# Patient Record
Sex: FEMALE | Race: WHITE | Employment: OTHER | URBAN - METROPOLITAN AREA
[De-identification: names, ages, dates, MRNs, and addresses within clinical notes are randomized per-mention and may not be internally consistent; named-entity substitution may affect disease eponyms.]

---

## 2019-09-02 ENCOUNTER — TELEPHONE (OUTPATIENT)
Dept: INTERNAL MEDICINE CLINIC | Age: 79
End: 2019-09-02

## 2019-09-02 NOTE — TELEPHONE ENCOUNTER
Pt calls stating right leg sore and has some swelling. States has varicose veins. Symptoms ongoing for a week. Not seen in office since 2015. Referred to urgent care if feels she needs to be seen today otherwise call office tomorrow for an appointment.

## 2019-09-06 ENCOUNTER — OFFICE VISIT (OUTPATIENT)
Dept: FAMILY MEDICINE CLINIC | Age: 79
End: 2019-09-06

## 2019-09-06 VITALS
OXYGEN SATURATION: 98 % | TEMPERATURE: 98.5 F | WEIGHT: 213 LBS | HEIGHT: 63 IN | SYSTOLIC BLOOD PRESSURE: 131 MMHG | RESPIRATION RATE: 18 BRPM | HEART RATE: 90 BPM | BODY MASS INDEX: 37.74 KG/M2 | DIASTOLIC BLOOD PRESSURE: 61 MMHG

## 2019-09-06 DIAGNOSIS — M19.90 OSTEOARTHRITIS: ICD-10-CM

## 2019-09-06 DIAGNOSIS — R53.83 TIRED: ICD-10-CM

## 2019-09-06 DIAGNOSIS — Z00.00 ENCOUNTER FOR INITIAL PREVENTIVE PHYSICAL EXAMINATION COVERED BY MEDICARE: Primary | ICD-10-CM

## 2019-09-06 DIAGNOSIS — D50.8 OTHER IRON DEFICIENCY ANEMIA: ICD-10-CM

## 2019-09-06 DIAGNOSIS — E66.01 SEVERE OBESITY (HCC): ICD-10-CM

## 2019-09-06 DIAGNOSIS — K21.9 GASTROESOPHAGEAL REFLUX DISEASE WITHOUT ESOPHAGITIS: ICD-10-CM

## 2019-09-06 RX ORDER — OMEPRAZOLE 20 MG/1
20 CAPSULE, DELAYED RELEASE ORAL DAILY
Qty: 90 CAP | Refills: 1 | Status: SHIPPED | OUTPATIENT
Start: 2019-09-06 | End: 2020-08-04

## 2019-09-06 RX ORDER — OMEPRAZOLE 20 MG/1
20 CAPSULE, DELAYED RELEASE ORAL DAILY
Qty: 90 CAP | Refills: 1 | Status: SHIPPED | OUTPATIENT
Start: 2019-09-06 | End: 2019-09-06 | Stop reason: SDUPTHER

## 2019-09-06 NOTE — PROGRESS NOTES
This is the Subsequent Medicare Annual Wellness Exam, performed 12 months or more after the Initial AWV or the last Subsequent AWV    I have reviewed the patient's medical history in detail and updated the computerized patient record. History     Past Medical History:   Diagnosis Date    Anemia due to chronic blood loss 3/19/2011    DDD (degenerative disc disease), lumbar     ruptured disc    GI bleed 3/19/2011      Past Surgical History:   Procedure Laterality Date    DEXA BONE DENSITY STUDY AXIAL  4/12/11    nml    HX CATARACT REMOVAL  2008    left, with lens    HX CHOLECYSTECTOMY      HX COLONOSCOPY  3/11    HX HEENT      deviated septum repair    HX ORTHOPAEDIC  1984    right elbow fracture repair    HX ORTHOPAEDIC  2011    right elbow, carpal tunnel    HX TONSILLECTOMY      SOY MAMMOGRAM DIGITAL BILATERAL  4/5/12    nml    NEUROLOGICAL PROCEDURE UNLISTED  2009    ulnar release--right elbow    TOTAL HIP ARTHROPLASTY  1998    left     Current Outpatient Medications   Medication Sig Dispense Refill    omeprazole (PRILOSEC) 20 mg capsule Take 1 Cap by mouth daily. 90 Cap 1    omeprazole (PRILOSEC) 20 mg capsule Take 1 Cap by mouth daily. 90 Cap 2    white petrolatum (DRY EYES) ointment Administer  to both eyes every twelve (12) hours.  dextran 70-hypromellose (ARTIFICIAL TEARS) ophthalmic solution Administer  to both eyes as needed.  MAGNESIUM HYDROXIDE (MILK OF MAGNESIA PO) Take  by mouth.  BISMUTH SUBSALICYLATE (PEPTO-BISMOL PO) Take  by mouth.  propylene glycol-glycerin (ARTIFICIAL TEARS,GLYCERIN-PEG,) 1-0.3 % drop Apply  to eye.  SIMETHICONE (GAS-X PO) Take  by mouth.  white petrolatum (LACRILUBE) ointment Administer  to both eyes every twelve (12) hours.  naproxen sodium (ALEVE) 220 mg cap Take  by mouth.        Allergies   Allergen Reactions    Contuss Unknown (comments)     Unknown      Sulfa (Sulfonamide Antibiotics) Hives and Swelling     Family History   Problem Relation Age of Onset    Alcohol abuse Father     COPD Father    [employer] Cancer Brother         bladder    Alcohol abuse Sister     Dementia Sister     Heart Disease Brother         PAD    Heart Disease Brother      Social History     Tobacco Use    Smoking status: Never Smoker    Smokeless tobacco: Never Used   Substance Use Topics    Alcohol use: No     Patient Active Problem List   Diagnosis Code    GERD (gastroesophageal reflux disease) K21.9    Ghotra's esophagus K22.70    Dry eyes H04.123    TMJ arthralgia M26.629    Rosacea L71.9    Allergic rhinitis J30.9    Headache(784.0) R51    Insomnia G47.00    Cataract H26.9    Osteoarthritis M19.90    Diverticula of colon K57.30    IBS (irritable bowel syndrome) K58.9    Severe obesity ([address] E66.01       Depression Risk Factor Screening:     3 most recent PHQ Screens 9/6/2019   Little interest or pleasure in doing things Several days   Feeling down, depressed, irritable, or hopeless Several days   Total Score PHQ 2 2     Alcohol Risk Factor Screening: You do not drink alcohol or very rarely. Functional Ability and Level of Safety:   Hearing Loss  Hearing is good. Activities of Daily Living  The home contains: cane  Patient does total self care    Fall Risk  Fall Risk Assessment, last 12 mths 3/26/2015   Able to walk? Yes   Fall in past 12 months?  Yes       Abuse Screen  Patient is not abused    Cognitive Screening   Evaluation of Cognitive Function:  Has your family/caregiver stated any concerns about your memory: no  Normal    Patient Care Team   Patient Care Team:  Antione Nevarez NP as PCP - General (Family Practice)  Ally Grajeda, RN as Nurse Navigator (Internal Medicine)  Rosa M Li MD as Physician (Ophthalmology)  Christy Batista MD as Surgeon (Orthopedic Surgery)  Kaur Copeland MD as Physician (Gastroenterology)    Assessment/Plan   Education and counseling provided:  Are appropriate based on today's review and evaluation    Diagnoses and all orders for this visit:    1. Encounter for initial preventive physical examination covered by Medicare  -     LIPID PANEL    2. Severe obesity ([address]    3. Tired  -     METABOLIC PANEL, COMPREHENSIVE  -     CBC W/O DIFF  -     TSH 3RD GENERATION  -     T4, FREE  -     VITAMIN D, 25 HYDROXY  -     VITAMIN B12    4. Gastroesophageal reflux disease without esophagitis  -     omeprazole (PRILOSEC) 20 mg capsule; Take 1 Cap by mouth daily. 5. Other iron deficiency anemia  -     CBC W/O DIFF    6.  Osteoarthritis        Health Maintenance Due   Topic Date Due    DTaP/Tdap/Td series (1 - Tdap) 02/23/1963    Shingrix Vaccine Age 50> (1 of 2) 02/23/1992    PAP AKA CERVICAL CYTOLOGY  03/26/2016    Pneumococcal 65+ years (2 of 2 - PPSV23) 03/26/2016    GLAUCOMA SCREENING Q2Y  10/08/2017    Influenza Age 9 to Adult  08/01/2019

## 2019-09-06 NOTE — PROGRESS NOTES
HISTORY OF PRESENT ILLNESS  Anselmo Cedeno is a 70 y.o. female. HPI: New patient comes in to get established and has history of obesity, anemia, GERD and osteoarthritis . She comes in today complaining of fatigue and bilateral hip and lower leg pains. She has order for iron pill, but she is not taking them states,--they bother my stomach-- for hip pain she take one OTC Advil occasionally. Patient is due for medicare physical.    Past Medical History:   Diagnosis Date    Anemia due to chronic blood loss 3/19/2011    DDD (degenerative disc disease), lumbar     ruptured disc    GI bleed 3/19/2011    Obesity (BMI 30-39. 9)      Past Surgical History:   Procedure Laterality Date    DEXA BONE DENSITY STUDY AXIAL  4/12/11    nml    HX CATARACT REMOVAL  2008    left, with lens    HX CHOLECYSTECTOMY      HX COLONOSCOPY  3/11    HX HEENT      deviated septum repair    HX ORTHOPAEDIC  1984    right elbow fracture repair    HX ORTHOPAEDIC  2011    right elbow, carpal tunnel    HX TONSILLECTOMY      SOY MAMMOGRAM DIGITAL BILATERAL  4/5/12    nml    NEUROLOGICAL PROCEDURE UNLISTED  2009    ulnar release--right elbow    TOTAL HIP ARTHROPLASTY  1998    left     Allergies   Allergen Reactions    Contuss Unknown (comments)     Unknown      Sulfa (Sulfonamide Antibiotics) Hives and Swelling       Current Outpatient Medications:     omeprazole (PRILOSEC) 20 mg capsule, Take 1 Cap by mouth daily. , Disp: 90 Cap, Rfl: 1    white petrolatum (DRY EYES) ointment, Administer  to both eyes every twelve (12) hours. , Disp: , Rfl:     dextran 70-hypromellose (ARTIFICIAL TEARS) ophthalmic solution, Administer  to both eyes as needed. , Disp: , Rfl:     MAGNESIUM HYDROXIDE (MILK OF MAGNESIA PO), Take  by mouth., Disp: , Rfl:     BISMUTH SUBSALICYLATE (PEPTO-BISMOL PO), Take  by mouth., Disp: , Rfl:     propylene glycol-glycerin (ARTIFICIAL TEARS,GLYCERIN-PEG,) 1-0.3 % drop, Apply  to eye., Disp: , Rfl:     SIMETHICONE (GAS-X PO), Take  by mouth., Disp: , Rfl:     white petrolatum (LACRILUBE) ointment, Administer  to both eyes every twelve (12) hours. , Disp: , Rfl:     naproxen sodium (ALEVE) 220 mg cap, Take  by mouth., Disp: , Rfl:   Review of Systems   Constitutional: Positive for malaise/fatigue. HENT: Negative. Respiratory: Negative. Cardiovascular: Negative. Gastrointestinal: Negative. Genitourinary: Negative. Musculoskeletal: Positive for joint pain. Psychiatric/Behavioral: Negative. Blood pressure 131/61, pulse 90, temperature 98.5 °F (36.9 °C), temperature source Oral, resp. rate 18, height 5' 2.75\" (1.594 m), weight 213 lb (96.6 kg), SpO2 98 %. Body mass index is 38.03 kg/m². Physical Exam   Constitutional: No distress. Eyes: Pupils are equal, round, and reactive to light. Conjunctivae and EOM are normal.   Neck: Neck supple. Cardiovascular: Normal rate and regular rhythm. Pulmonary/Chest: Effort normal and breath sounds normal.   Abdominal: Soft. Bowel sounds are normal.   Musculoskeletal: She exhibits tenderness. Bilateral hip and lower legs tenderness  Walking with cane   Skin: Skin is warm and dry. Psychiatric: She has a normal mood and affect. Her behavior is normal.   Nursing note and vitals reviewed. ASSESSMENT and PLAN  Diagnoses and all orders for this visit:    1. Encounter for initial preventive physical examination covered by Medicare  -     LIPID PANEL    2. Severe obesity ([address]    3. Tired  -     METABOLIC PANEL, COMPREHENSIVE  -     CBC W/O DIFF  -     TSH 3RD GENERATION  -     T4, FREE  -     VITAMIN D, 25 HYDROXY  -     VITAMIN B12    4. Gastroesophageal reflux disease without esophagitis  -     omeprazole (PRILOSEC) 20 mg capsule; Take 1 Cap by mouth daily. 5. Other iron deficiency anemia  -     CBC W/O DIFF    6.  Osteoarthritis      Take Advil or tylenol  Follow up in three weeks  Pt was given an after visit summary which includes diagnosis, current medicines and vital and voiced understanding of treatment plan

## 2019-09-06 NOTE — PROGRESS NOTES
Chief Complaint   Patient presents with   [address]     Patient is in the office today as a new patient to the [facility].  Leg Pain     discuss right side leg pain. 1. Have you been to the ER, urgent care clinic since your last visit? Hospitalized since your last visit? No    2. Have you seen or consulted any other health care providers outside of the [address] [place] [address] since your last visit? Include any pap smears or colon screening.  No

## 2019-09-06 NOTE — PATIENT INSTRUCTIONS
Medicare Wellness Visit, Female     The best way to live healthy is to have a lifestyle where you eat a well-balanced diet, exercise regularly, limit alcohol use, and quit all forms of tobacco/nicotine, if applicable. Regular preventive services are another way to keep healthy. Preventive services (vaccines, screening tests, monitoring & exams) can help personalize your care plan, which helps you manage your own care. Screening tests can find health problems at the earliest stages, when they are easiest to treat. [facility] follows the current, evidence-based guidelines published by the [facility] ([facility]) when recommending preventive services for our patients. Because we follow these guidelines, sometimes recommendations change over time as research supports it. (For example, mammograms used to be recommended annually. Even though Medicare will still pay for an annual mammogram, the newer guidelines recommend a mammogram every two years for women of average risk.)  Of course, you and your doctor may decide to screen more often for some diseases, based on your risk and your health status. Preventive services for you include:  - Medicare offers their members a free annual wellness visit, which is time for you and your primary care provider to discuss and plan for your preventive service needs. Take advantage of this benefit every year!  -All adults over the age of 72 should receive the recommended pneumonia vaccines. Current USPSTF guidelines recommend a series of two vaccines for the best pneumonia protection.   -All adults should have a flu vaccine yearly and a tetanus vaccine every 10 years. All adults age 61 and older should receive a shingles vaccine once in their lifetime.    -A bone mass density test is recommended when a woman turns 65 to screen for osteoporosis. This test is only recommended one time, as a screening.  Some providers will use this same test as a disease monitoring tool if you already have osteoporosis. -All adults age 38-68 who are overweight should have a diabetes screening test once every three years.   -Other screening tests and preventive services for persons with diabetes include: an eye exam to screen for diabetic retinopathy, a kidney function test, a foot exam, and stricter control over your cholesterol.   -Cardiovascular screening for adults with routine risk involves an electrocardiogram (ECG) at intervals determined by your doctor.   -Colorectal cancer screenings should be done for adults age 54-65 with no increased risk factors for colorectal cancer. There are a number of acceptable methods of screening for this type of cancer. Each test has its own benefits and drawbacks. Discuss with your doctor what is most appropriate for you during your annual wellness visit. The different tests include: colonoscopy (considered the best screening method), a fecal occult blood test, a fecal DNA test, and sigmoidoscopy. -Breast cancer screenings are recommended every other year for women of normal risk, age 54-69.  -Cervical cancer screenings for women over age 72 are only recommended with certain risk factors.   -All adults born between [facility] should be screened once for Hepatitis C.      Here is a list of your current Health Maintenance items (your personalized list of preventive services) with a due date:  Health Maintenance Due   Topic Date Due    DTaP/Tdap/Td  (1 - Tdap) 02/23/1963    Shingles Vaccine (1 of 2) 02/23/1992    Pap Test  03/26/2016    Pneumococcal Vaccine (2 of 2 - PPSV23) 03/26/2016    Glaucoma Screening   10/08/2017    Flu Vaccine  08/01/2019

## 2019-09-07 LAB
25(OH)D3+25(OH)D2 SERPL-MCNC: 14.8 NG/ML (ref 30–100)
ALBUMIN SERPL-MCNC: 4.3 G/DL (ref 3.5–4.8)
ALBUMIN/GLOB SERPL: 1.3 {RATIO} (ref 1.2–2.2)
ALP SERPL-CCNC: 82 IU/L (ref 39–117)
ALT SERPL-CCNC: 11 IU/L (ref 0–32)
AST SERPL-CCNC: 21 IU/L (ref 0–40)
BILIRUB SERPL-MCNC: 1.3 MG/DL (ref 0–1.2)
BUN SERPL-MCNC: 13 MG/DL (ref 8–27)
BUN/CREAT SERPL: 22 (ref 12–28)
CALCIUM SERPL-MCNC: 9 MG/DL (ref 8.7–10.3)
CHLORIDE SERPL-SCNC: 102 MMOL/L (ref 96–106)
CHOLEST SERPL-MCNC: 147 MG/DL (ref 100–199)
CO2 SERPL-SCNC: 20 MMOL/L (ref 20–29)
CREAT SERPL-MCNC: 0.58 MG/DL (ref 0.57–1)
ERYTHROCYTE [DISTWIDTH] IN BLOOD BY AUTOMATED COUNT: 19.2 % (ref 12.3–15.4)
GLOBULIN SER CALC-MCNC: 3.2 G/DL (ref 1.5–4.5)
GLUCOSE SERPL-MCNC: 108 MG/DL (ref 65–99)
HCT VFR BLD AUTO: 26.8 % (ref 34–46.6)
HDLC SERPL-MCNC: 66 MG/DL
HGB BLD-MCNC: 6.5 G/DL (ref 11.1–15.9)
INTERPRETATION, 910389: NORMAL
LDLC SERPL CALC-MCNC: 66 MG/DL (ref 0–99)
MCH RBC QN AUTO: 15.4 PG (ref 26.6–33)
MCHC RBC AUTO-ENTMCNC: 24.3 G/DL (ref 31.5–35.7)
MCV RBC AUTO: 63 FL (ref 79–97)
PLATELET # BLD AUTO: 263 X10E3/UL (ref 150–450)
POTASSIUM SERPL-SCNC: 4.1 MMOL/L (ref 3.5–5.2)
PROT SERPL-MCNC: 7.5 G/DL (ref 6–8.5)
RBC # BLD AUTO: 4.23 X10E6/UL (ref 3.77–5.28)
SODIUM SERPL-SCNC: 139 MMOL/L (ref 134–144)
T4 FREE SERPL-MCNC: 1.07 NG/DL (ref 0.82–1.77)
TRIGL SERPL-MCNC: 77 MG/DL (ref 0–149)
TSH SERPL DL<=0.005 MIU/L-ACNC: 4.28 UIU/ML (ref 0.45–4.5)
VLDLC SERPL CALC-MCNC: 15 MG/DL (ref 5–40)
WBC # BLD AUTO: 8.4 X10E3/UL (ref 3.4–10.8)

## 2019-09-09 ENCOUNTER — TELEPHONE (OUTPATIENT)
Dept: FAMILY MEDICINE CLINIC | Age: 79
End: 2019-09-09

## 2019-09-09 RX ORDER — ERGOCALCIFEROL 1.25 MG/1
50000 CAPSULE ORAL
Qty: 12 CAP | Refills: 4 | Status: SHIPPED | OUTPATIENT
Start: 2019-09-09 | End: 2019-09-10

## 2019-09-09 NOTE — TELEPHONE ENCOUNTER
Karlo Dickey, AKI Oliver LPN   Caller: Unspecified (Today, 11:55 AM)             Spoke to infusing center and informed them I sent the patient to Er not infusion center.  ER will write the order for transfusion

## 2019-09-09 NOTE — TELEPHONE ENCOUNTER
----- Message from Dorita Bledsoe sent at 9/9/2019  2:29 PM EDT -----  Regarding: AKI Nevarez/Telephone  General Message/Vendor Calls    Caller's first and last name:      Reason for call:      Callback required yes/no and why: yes      Best contact number(s):[phone]      Details to clarify the request: Patient is suppose to go to [facility] for a blood transfusion and needs to know where to go.        Dorita Bledsoe

## 2019-09-09 NOTE — TELEPHONE ENCOUNTER
Ciarra Chaudhary pt infusion center at [facility], is calling to advise the doctor that this facility is not a walk in clinic and this patient can not be seen.  Would need to contact [facility] [place] @[phone]        [facility]   [phone]

## 2019-09-10 ENCOUNTER — HOSPITAL ENCOUNTER (INPATIENT)
Age: 79
LOS: 2 days | Discharge: HOME | DRG: 378 | End: 2019-09-12
Attending: EMERGENCY MEDICINE | Admitting: INTERNAL MEDICINE
Payer: MEDICARE

## 2019-09-10 ENCOUNTER — APPOINTMENT (OUTPATIENT)
Dept: VASCULAR SURGERY | Age: 79
DRG: 378 | End: 2019-09-10
Attending: EMERGENCY MEDICINE
Payer: MEDICARE

## 2019-09-10 DIAGNOSIS — R06.02 SOB (SHORTNESS OF BREATH): ICD-10-CM

## 2019-09-10 DIAGNOSIS — R60.0 LEG EDEMA, RIGHT: ICD-10-CM

## 2019-09-10 DIAGNOSIS — D50.0 IRON DEFICIENCY ANEMIA DUE TO CHRONIC BLOOD LOSS: ICD-10-CM

## 2019-09-10 DIAGNOSIS — D50.9 HYPOCHROMIC MICROCYTIC ANEMIA: Primary | ICD-10-CM

## 2019-09-10 PROBLEM — D64.9 SEVERE ANEMIA: Status: ACTIVE | Noted: 2019-09-10

## 2019-09-10 LAB
ALBUMIN SERPL-MCNC: 3.3 G/DL (ref 3.5–5)
ALBUMIN/GLOB SERPL: 0.8 {RATIO} (ref 1.1–2.2)
ALP SERPL-CCNC: 74 U/L (ref 45–117)
ALT SERPL-CCNC: 14 U/L (ref 12–78)
ANION GAP SERPL CALC-SCNC: 8 MMOL/L (ref 5–15)
AST SERPL-CCNC: 13 U/L (ref 15–37)
BASOPHILS # BLD: 0.2 K/UL (ref 0–0.1)
BASOPHILS NFR BLD: 3 % (ref 0–1)
BILIRUB SERPL-MCNC: 1.2 MG/DL (ref 0.2–1)
BUN SERPL-MCNC: 19 MG/DL (ref 6–20)
BUN/CREAT SERPL: 34 (ref 12–20)
CALCIUM SERPL-MCNC: 8.7 MG/DL (ref 8.5–10.1)
CHLORIDE SERPL-SCNC: 105 MMOL/L (ref 97–108)
CO2 SERPL-SCNC: 26 MMOL/L (ref 21–32)
COMMENT, HOLDF: NORMAL
CREAT SERPL-MCNC: 0.56 MG/DL (ref 0.55–1.02)
DIFFERENTIAL METHOD BLD: ABNORMAL
EOSINOPHIL # BLD: 0.2 K/UL (ref 0–0.4)
EOSINOPHIL NFR BLD: 3 % (ref 0–7)
ERYTHROCYTE [DISTWIDTH] IN BLOOD BY AUTOMATED COUNT: 20 % (ref 11.5–14.5)
FERRITIN SERPL-MCNC: 7 NG/ML (ref 8–252)
FOLATE SERPL-MCNC: 14.3 NG/ML (ref 5–21)
GLOBULIN SER CALC-MCNC: 4.2 G/DL (ref 2–4)
GLUCOSE SERPL-MCNC: 100 MG/DL (ref 65–100)
HCT VFR BLD AUTO: 25.1 % (ref 35–47)
HGB BLD-MCNC: 6 G/DL (ref 11.5–16)
IMM GRANULOCYTES # BLD AUTO: 0 K/UL
IMM GRANULOCYTES NFR BLD AUTO: 0 %
INR PPP: 1 (ref 0.9–1.1)
IRON SATN MFR SERPL: 2 % (ref 20–50)
IRON SERPL-MCNC: 12 UG/DL (ref 35–150)
LYMPHOCYTES # BLD: 1.2 K/UL (ref 0.8–3.5)
LYMPHOCYTES NFR BLD: 20 % (ref 12–49)
MCH RBC QN AUTO: 15.4 PG (ref 26–34)
MCHC RBC AUTO-ENTMCNC: 23.9 G/DL (ref 30–36.5)
MCV RBC AUTO: 64.5 FL (ref 80–99)
MONOCYTES # BLD: 0.5 K/UL (ref 0–1)
MONOCYTES NFR BLD: 9 % (ref 5–13)
NEUTS SEG # BLD: 3.7 K/UL (ref 1.8–8)
NEUTS SEG NFR BLD: 65 % (ref 32–75)
NRBC # BLD: 0 K/UL (ref 0–0.01)
NRBC BLD-RTO: 0 PER 100 WBC
PLATELET # BLD AUTO: 188 K/UL (ref 150–400)
POTASSIUM SERPL-SCNC: 3.6 MMOL/L (ref 3.5–5.1)
PROT SERPL-MCNC: 7.5 G/DL (ref 6.4–8.2)
PROTHROMBIN TIME: 10.1 SEC (ref 9–11.1)
RBC # BLD AUTO: 3.89 M/UL (ref 3.8–5.2)
RBC MORPH BLD: ABNORMAL
SAMPLES BEING HELD,HOLD: NORMAL
SODIUM SERPL-SCNC: 139 MMOL/L (ref 136–145)
TIBC SERPL-MCNC: 516 UG/DL (ref 250–450)
VIT B12 SERPL-MCNC: 344 PG/ML (ref 193–986)
WBC # BLD AUTO: 5.8 K/UL (ref 3.6–11)

## 2019-09-10 PROCEDURE — 65410000002 HC RM PRIVATE OB

## 2019-09-10 PROCEDURE — 85610 PROTHROMBIN TIME: CPT

## 2019-09-10 PROCEDURE — 82728 ASSAY OF FERRITIN: CPT

## 2019-09-10 PROCEDURE — 36430 TRANSFUSION BLD/BLD COMPNT: CPT

## 2019-09-10 PROCEDURE — 85025 COMPLETE CBC W/AUTO DIFF WBC: CPT

## 2019-09-10 PROCEDURE — 36415 COLL VENOUS BLD VENIPUNCTURE: CPT

## 2019-09-10 PROCEDURE — 30233N1 TRANSFUSION OF NONAUTOLOGOUS RED BLOOD CELLS INTO PERIPHERAL VEIN, PERCUTANEOUS APPROACH: ICD-10-PCS | Performed by: INTERNAL MEDICINE

## 2019-09-10 PROCEDURE — 93005 ELECTROCARDIOGRAM TRACING: CPT

## 2019-09-10 PROCEDURE — 93971 EXTREMITY STUDY: CPT

## 2019-09-10 PROCEDURE — 96374 THER/PROPH/DIAG INJ IV PUSH: CPT

## 2019-09-10 PROCEDURE — 82746 ASSAY OF FOLIC ACID SERUM: CPT

## 2019-09-10 PROCEDURE — C9113 INJ PANTOPRAZOLE SODIUM, VIA: HCPCS | Performed by: INTERNAL MEDICINE

## 2019-09-10 PROCEDURE — 80053 COMPREHEN METABOLIC PANEL: CPT

## 2019-09-10 PROCEDURE — C9113 INJ PANTOPRAZOLE SODIUM, VIA: HCPCS | Performed by: EMERGENCY MEDICINE

## 2019-09-10 PROCEDURE — 86923 COMPATIBILITY TEST ELECTRIC: CPT

## 2019-09-10 PROCEDURE — 74011000250 HC RX REV CODE- 250: Performed by: INTERNAL MEDICINE

## 2019-09-10 PROCEDURE — P9016 RBC LEUKOCYTES REDUCED: HCPCS

## 2019-09-10 PROCEDURE — 86900 BLOOD TYPING SEROLOGIC ABO: CPT

## 2019-09-10 PROCEDURE — 74011250636 HC RX REV CODE- 250/636: Performed by: EMERGENCY MEDICINE

## 2019-09-10 PROCEDURE — 99285 EMERGENCY DEPT VISIT HI MDM: CPT

## 2019-09-10 PROCEDURE — 82607 VITAMIN B-12: CPT

## 2019-09-10 PROCEDURE — 83540 ASSAY OF IRON: CPT

## 2019-09-10 PROCEDURE — 74011250636 HC RX REV CODE- 250/636: Performed by: INTERNAL MEDICINE

## 2019-09-10 RX ORDER — SODIUM CHLORIDE 0.9 % (FLUSH) 0.9 %
5-40 SYRINGE (ML) INJECTION EVERY 8 HOURS
Status: DISCONTINUED | OUTPATIENT
Start: 2019-09-10 | End: 2019-09-12 | Stop reason: HOSPADM

## 2019-09-10 RX ORDER — PANTOPRAZOLE SODIUM 40 MG/10ML
40 INJECTION, POWDER, LYOPHILIZED, FOR SOLUTION INTRAVENOUS
Status: COMPLETED | OUTPATIENT
Start: 2019-09-10 | End: 2019-09-10

## 2019-09-10 RX ORDER — BISMUTH SUBSALICYLATE 262 MG/15ML
30 LIQUID ORAL
COMMUNITY

## 2019-09-10 RX ORDER — ADHESIVE BANDAGE
30 BANDAGE TOPICAL DAILY PRN
COMMUNITY

## 2019-09-10 RX ORDER — SODIUM CHLORIDE 0.9 % (FLUSH) 0.9 %
5-40 SYRINGE (ML) INJECTION AS NEEDED
Status: DISCONTINUED | OUTPATIENT
Start: 2019-09-10 | End: 2019-09-12 | Stop reason: HOSPADM

## 2019-09-10 RX ORDER — SODIUM CHLORIDE 9 MG/ML
250 INJECTION, SOLUTION INTRAVENOUS AS NEEDED
Status: DISCONTINUED | OUTPATIENT
Start: 2019-09-10 | End: 2019-09-12 | Stop reason: HOSPADM

## 2019-09-10 RX ORDER — ACETAMINOPHEN 325 MG/1
650 TABLET ORAL
Status: DISCONTINUED | OUTPATIENT
Start: 2019-09-10 | End: 2019-09-12 | Stop reason: HOSPADM

## 2019-09-10 RX ADMIN — PANTOPRAZOLE SODIUM 40 MG: 40 INJECTION, POWDER, FOR SOLUTION INTRAVENOUS at 12:40

## 2019-09-10 RX ADMIN — Medication 10 ML: at 21:03

## 2019-09-10 RX ADMIN — SODIUM CHLORIDE 40 MG: 9 INJECTION INTRAMUSCULAR; INTRAVENOUS; SUBCUTANEOUS at 20:58

## 2019-09-10 NOTE — ED PROVIDER NOTES
70 y.o. female with past medical history significant for DDD, anemia, GI bleed, obesity who presents via private vehicle with chief complaint of abnormal lab results. Pt reports that she was seen by her PCP a few days ago and had bloodwork that showed a hemoglobin of 6.5. Pt has hx of iron deficiency anemia. She is supposed to be taking iron supplements but reports that they hurt her stomach. Pt was admitted to the hospital a few years ago and had to get blood transfusions. Pt c/o fatigue and LAURENT. Pt also reports R leg swelling with a \"lump\" in her R groin. The \"Lump\" has since resolved. Pt has never seen a hematologist. Denies injury to the leg. Denies dark or tarry stool. Denies recent long travel. There are no other acute medical concerns at this time. Social hx: denies tobacco use, denies EtOH consumption     PCP: Delfina Ahumada NP    Note written by Katie Pearson, as dictated by Katy Mullins DO 11:19 AM      The history is provided by the patient. No  was used. Past Medical History:   Diagnosis Date    Anemia due to chronic blood loss 3/19/2011    DDD (degenerative disc disease), lumbar     ruptured disc    GI bleed 3/19/2011    Obesity (BMI 30-39. 9)        Past Surgical History:   Procedure Laterality Date    DEXA BONE DENSITY STUDY AXIAL  4/12/11    nml    HX CATARACT REMOVAL  2008    left, with lens    HX CHOLECYSTECTOMY      HX COLONOSCOPY  3/11    HX HEENT      deviated septum repair    HX ORTHOPAEDIC  1984    right elbow fracture repair    HX ORTHOPAEDIC  2011    right elbow, carpal tunnel    HX TONSILLECTOMY      SOY MAMMOGRAM DIGITAL BILATERAL  4/5/12    nml    NEUROLOGICAL PROCEDURE UNLISTED  2009    ulnar release--right elbow    TOTAL HIP ARTHROPLASTY  1998    left         Family History:   Problem Relation Age of Onset    Alcohol abuse Father     COPD Father     Cancer Brother         bladder    Alcohol abuse Sister    Nikki Varghese Dementia Sister     Heart Disease Brother         PAD    Heart Disease Brother        Social History     Socioeconomic History    Marital status:      Spouse name:     Number of children: 3    Years of education: Not on file    Highest education level: Not on file   Occupational History    Occupation: Retired--government     Employer: RETIRED   Social Needs    Financial resource strain: Not on file    Food insecurity:     Worry: Not on file     Inability: Not on file    Transportation needs:     Medical: Not on file     Non-medical: Not on file   Tobacco Use    Smoking status: Never Smoker    Smokeless tobacco: Never Used   Substance and Sexual Activity    Alcohol use: No    Drug use: No    Sexual activity: Not Currently   Lifestyle    Physical activity:     Days per week: Not on file     Minutes per session: Not on file    Stress: Not on file   Relationships    Social connections:     Talks on phone: Not on file     Gets together: Not on file     Attends [employer] service: Not on file     Active member of club or organization: Not on file     Attends meetings of clubs or organizations: Not on file     Relationship status: Not on file    Intimate partner violence:     Fear of current or ex partner: Not on file     Emotionally abused: Not on file     Physically abused: Not on file     Forced sexual activity: Not on file   Other Topics Concern    Not on file   Social History Narrative    Not on file         ALLERGIES: Contuss and Sulfa (sulfonamide antibiotics)    Review of Systems   Constitutional: Positive for fatigue. Negative for fever. HENT: Negative for trouble swallowing. Eyes: Negative for visual disturbance. Respiratory: Positive for shortness of breath. Negative for cough. Cardiovascular: Positive for leg swelling. Negative for chest pain. Gastrointestinal: Negative for abdominal pain. Genitourinary: Negative for difficulty urinating.    Musculoskeletal: Negative for gait problem. Skin: Negative for rash. Neurological: Negative for headaches. Hematological: Does not bruise/bleed easily. Psychiatric/Behavioral: Negative for sleep disturbance. All other systems reviewed and are negative. Vitals:    09/10/19 1107   BP: 143/70   Pulse: 83   Resp: 20   Temp: 98.1 °F (36.7 °C)   SpO2: 98%   Weight: 96.6 kg (213 lb)   Height: 5' 3\" (1.6 m)            Physical Exam   Constitutional: She is oriented to person, place, and time. She appears well-developed and well-nourished. HENT:   Head: Normocephalic and atraumatic. Eyes: Conjunctivae are normal.   Neck: Normal range of motion. Cardiovascular: Normal rate and intact distal pulses. Pulses:       Dorsalis pedis pulses are 2+ on the right side, and 2+ on the left side. RLE warm and well perfused    Pulmonary/Chest: Effort normal and breath sounds normal. No respiratory distress. Abdominal: Soft. Bowel sounds are normal. There is no tenderness. There is no rebound and no guarding. Musculoskeletal: Normal range of motion. She exhibits edema (bilateral LE, R>L). Neurological: She is alert and oriented to person, place, and time. Skin: Skin is warm and dry. Psychiatric: Her behavior is normal.   Nursing note and vitals reviewed.    Note written by Katie Adams, as dictated by Kevin Aguilar DO 12:42 PM        MDM  Number of Diagnoses or Management Options  Hypochromic microcytic anemia:   Leg edema, right:   SOB (shortness of breath):   Diagnosis management comments: Hospitalist Bucky for Admission  2:05 PM    ED Room Number: ER14/14  Patient Name and age:  Janalee Gottron 70 y.o.  female  Working Diagnosis: Hypochromic microcytic anemia  (primary encounter diagnosis)  SOB (shortness of breath)  Leg edema, right  Readmission: no  Isolation Requirements:  no  Recommended Level of Care:  med/surg  Code Status:  Full Code  Department:[facility] Adult ED - 21   Other: Unknown baseline hemoglobin, but has required transfusions in the past.  Occasional dark stools, but could be from pepto. Duplex of RLE pending. Procedures    ED EKG interpretation:  Rhythm: normal sinus rhythm; and regular . Rate (approx.): 73;  Axis: normal; ST/T wave: normal; nl intervals  Note written by Katie Gonzalez, as dictated by Elias Frey DO 12:09 PM

## 2019-09-10 NOTE — H&P
History & Physical    Primary Care Provider: Hedy Devlin NP  Source of Information: Patient     History of Presenting Illness:   Ms Monique Craven is a 71 y/o female with Mhx of anemia, previous GI bleed and DDD who presents to the hospital after recent labs work showing hgb 6.6    Per patient report she was feeling weak, sob and fatigue that warranted follow up with her PCP. She was no longer followed by her PCP because of previous multiple missed appointments, therefore she went to a walk-in clinic that checked her hgb and noted to be 6.5. Patient has iron deficiency anemia but doesn't take iron supplement due to epigastric discomfort while taking the pills. The cause of her anemia is not known but has been admitted in the past and received blood transfusion. She also underwent EGD/Colonocopy and capsule endoscopy with no clear source of blood loss. She now denies nausea, vomiting, hematemesis, melena or hematochezia. She denied continuous NSAID use but mentioned using once in a while like maybe once in a month. She lives alone and has poor nutrition. She also noted swelling her right leg since last few weeks with a lump like localized swelling on her groin. The lump has resolved since few days. Review of Systems:  Review of Systems   Constitutional: Per HPI    HENT: Negative for ear pain, facial swelling, sore throat and trouble swallowing.    Eyes: No visual disturbance. Negative for pain and discharge. Respiratory: Negative for chest tightness, shortness of breath and wheezing.    Cardiovascular: Negative for chest pain and palpitations. Gastrointestinal: Per HPI   Genitourinary: Negative for difficulty urinating, flank pain and hematuria. Musculoskeletal: Negative for arthralgias, joint swelling, myalgias and neck pain. Skin: Negative for color change and rash. Neurological: Negative for  dizziness, headache, weakness or numbness.    Hematological: Negative for adenopathy. Does not bruise/bleed easily. Psychiatric/Behavioral: Negative for behavioral problems, confusion and sleep disturbance.   All other systems reviewed and are negative. Past Medical History:   Diagnosis Date    Anemia due to chronic blood loss 3/19/2011    DDD (degenerative disc disease), lumbar     ruptured disc    GI bleed 3/19/2011    Obesity (BMI 30-39. 9)       Past Surgical History:   Procedure Laterality Date    DEXA BONE DENSITY STUDY AXIAL  4/12/11    nml    HX CATARACT REMOVAL  2008    left, with lens    HX CHOLECYSTECTOMY      HX COLONOSCOPY  3/11    HX HEENT      deviated septum repair    HX ORTHOPAEDIC  1984    right elbow fracture repair    HX ORTHOPAEDIC  2011    right elbow, carpal tunnel    HX TONSILLECTOMY      SOY MAMMOGRAM DIGITAL BILATERAL  4/5/12    nml    NEUROLOGICAL PROCEDURE UNLISTED  2009    ulnar release--right elbow    TOTAL HIP ARTHROPLASTY  1998    left     Prior to Admission medications    Medication Sig Start Date End Date Taking? Authorizing Provider   bismuth subsalicylate (PEPTO-BISMOL) 262 mg/15 mL suspension Take 30 mL by mouth every four (4) hours as needed for Indigestion. Yes Provider, Historical   magnesium hydroxide (NAYAK MILK OF MAGNESIA) 400 mg/5 mL suspension Take 30 mL by mouth daily as needed for Constipation. Yes Provider, Historical   omeprazole (PRILOSEC) 20 mg capsule Take 1 Cap by mouth daily. 9/6/19  Yes Luis Nevarez, NP   white petrolatum (LACRILUBE) ointment Administer  to both eyes nightly. Yes Provider, Historical   dextran 70-hypromellose (ARTIFICIAL TEARS) ophthalmic solution Administer  to both eyes as needed. Yes Provider, Historical   naproxen sodium (ALEVE) 220 mg cap Take 1 Cap by mouth daily as needed.    Yes Provider, Historical     Allergies   Allergen Reactions    Contuss Unknown (comments)     Unknown      Sulfa (Sulfonamide Antibiotics) Hives and Swelling      Family History Problem Relation Age of Onset    Alcohol abuse Father     COPD Father     Cancer Brother         bladder    Alcohol abuse Sister     Dementia Sister     Heart Disease Brother         PAD    Heart Disease Brother         SOCIAL HISTORY:  Patient resides:  Independently x   Assisted Living    SNF    With family care       Smoking history:   None x   Former    Chronic      Alcohol history:   None x   Social    Chronic      Ambulates:   Independently    w/cane x   w/walker    w/wc    CODE STATUS:  DNR    Full x   Other      Objective:     Physical Exam:     Visit Vitals  /67 (BP 1 Location: Right arm, BP Patient Position: At rest)   Pulse 83   Temp 98 °F (36.7 °C)   Resp 16   Ht 5' 3\" (1.6 m)   Wt 96.6 kg (213 lb)   SpO2 95%   BMI 37.73 kg/m²      O2 Device: Room air    General:  Alert, cooperative, no distress, appears stated age. Head:  Normocephalic, without obvious abnormality, atraumatic. Eyes:  Conjunctivae/corneas clear. PERRL, EOMs intact. Nose: Nares normal. Septum midline. Mucosa normal. No drainage or sinus tenderness. Throat: Lips, mucosa, and tongue normal. Teeth and gums normal.   Neck: Supple, symmetrical, trachea midline, no adenopathy, thyroid: no enlargement/tenderness/nodules, no carotid bruit and no JVD. Back:   Symmetric, no curvature. ROM normal. No CVA tenderness. Lungs:   Clear to auscultation bilaterally. Chest wall:  No tenderness or deformity. Heart:  Regular rate and rhythm, S1, S2 normal, no murmur, click, rub or gallop. Abdomen:   Soft, non-tender. Bowel sounds normal. No masses,  No organomegaly. Extremities: Right extremity +2 edema    Pulses: 2+ and symmetric all extremities. Skin: Skin color, texture, turgor normal. No rashes or lesions   Neurologic: CNII-XII intact.           EKG:       Data Review:     Recent Days:  Recent Labs     09/10/19  1125   WBC 5.8   HGB 6.0*   HCT 25.1*        Recent Labs     09/10/19  1125      K 3.6    CO2 26      BUN 19   CREA 0.56   CA 8.7   ALB 3.3*   TBILI 1.2*   SGOT 13*   ALT 14   INR 1.0     No results for input(s): PH, PCO2, PO2, HCO3, FIO2 in the last 72 hours. 24 Hour Results:  Recent Results (from the past 24 hour(s))   EKG, 12 LEAD, INITIAL    Collection Time: 09/10/19 11:17 AM   Result Value Ref Range    Ventricular Rate 73 BPM    Atrial Rate 73 BPM    P-R Interval 152 ms    QRS Duration 86 ms    Q-T Interval 392 ms    QTC Calculation (Bezet) 431 ms    Calculated P Axis 40 degrees    Calculated R Axis 31 degrees    Calculated T Axis 28 degrees    Diagnosis       Normal sinus rhythm  When compared with ECG of 19-MAR-2011 12:22,  Left posterior fascicular block is no longer present  T wave inversion no longer evident in Lateral leads     CBC WITH AUTOMATED DIFF    Collection Time: 09/10/19 11:25 AM   Result Value Ref Range    WBC 5.8 3.6 - 11.0 K/uL    RBC 3.89 3.80 - 5.20 M/uL    HGB 6.0 (L) 11.5 - 16.0 g/dL    HCT 25.1 (L) 35.0 - 47.0 %    MCV 64.5 (L) 80.0 - 99.0 FL    MCH 15.4 (L) 26.0 - 34.0 PG    MCHC 23.9 (L) 30.0 - 36.5 g/dL    RDW 20.0 (H) 11.5 - 14.5 %    PLATELET 026 921 - 616 K/uL    NRBC 0.0 0  WBC    ABSOLUTE NRBC 0.00 0.00 - 0.01 K/uL    NEUTROPHILS 65 32 - 75 %    LYMPHOCYTES 20 12 - 49 %    MONOCYTES 9 5 - 13 %    EOSINOPHILS 3 0 - 7 %    BASOPHILS 3 (H) 0 - 1 %    IMMATURE GRANULOCYTES 0 %    ABS. NEUTROPHILS 3.7 1.8 - 8.0 K/UL    ABS. LYMPHOCYTES 1.2 0.8 - 3.5 K/UL    ABS. MONOCYTES 0.5 0.0 - 1.0 K/UL    ABS. EOSINOPHILS 0.2 0.0 - 0.4 K/UL    ABS. BASOPHILS 0.2 (H) 0.0 - 0.1 K/UL    ABS. IMM.  GRANS. 0.0 K/UL    DF SMEAR SCANNED      RBC COMMENTS ANISOCYTOSIS  1+        RBC COMMENTS MICROCYTOSIS  2+        RBC COMMENTS HYPOCHROMIA  4+        RBC COMMENTS OVALOCYTES  PRESENT       METABOLIC PANEL, COMPREHENSIVE    Collection Time: 09/10/19 11:25 AM   Result Value Ref Range    Sodium 139 136 - 145 mmol/L    Potassium 3.6 3.5 - 5.1 mmol/L    Chloride 105 97 - 108 mmol/L CO2 26 21 - 32 mmol/L    Anion gap 8 5 - 15 mmol/L    Glucose 100 65 - 100 mg/dL    BUN 19 6 - 20 MG/DL    Creatinine 0.56 0.55 - 1.02 MG/DL    BUN/Creatinine ratio 34 (H) 12 - 20      GFR est AA >60 >60 ml/min/1.73m2    GFR est non-AA >60 >60 ml/min/1.73m2    Calcium 8.7 8.5 - 10.1 MG/DL    Bilirubin, total 1.2 (H) 0.2 - 1.0 MG/DL    ALT (SGPT) 14 12 - 78 U/L    AST (SGOT) 13 (L) 15 - 37 U/L    Alk. phosphatase 74 45 - 117 U/L    Protein, total 7.5 6.4 - 8.2 g/dL    Albumin 3.3 (L) 3.5 - 5.0 g/dL    Globulin 4.2 (H) 2.0 - 4.0 g/dL    A-G Ratio 0.8 (L) 1.1 - 2.2     SAMPLES BEING HELD    Collection Time: 09/10/19 11:25 AM   Result Value Ref Range    SAMPLES BEING HELD  1 RED, 1 BLUE     COMMENT        Add-on orders for these samples will be processed based on acceptable specimen integrity and analyte stability, which may vary by analyte.    TYPE & SCREEN    Collection Time: 09/10/19 11:25 AM   Result Value Ref Range    Crossmatch Expiration 09/13/2019     ABO/Rh(D) O POSITIVE     Antibody screen NEG     Unit number [id number]     Blood component type [facility]     Unit division 00     Status of unit ISSUED     Crossmatch result Compatible     Unit number [id number]     Blood component type [facility]     Unit division 00     Status of unit ISSUED     Crossmatch result Compatible    PROTHROMBIN TIME + INR    Collection Time: 09/10/19 11:25 AM   Result Value Ref Range    INR 1.0 0.9 - 1.1      Prothrombin time 10.1 9.0 - 11.1 sec   FERRITIN    Collection Time: 09/10/19 11:25 AM   Result Value Ref Range    Ferritin 7 (L) 8 - 252 NG/ML   VITAMIN B12    Collection Time: 09/10/19 11:25 AM   Result Value Ref Range    Vitamin B12 344 193 - 986 pg/mL   FOLATE    Collection Time: 09/10/19 11:25 AM   Result Value Ref Range    Folate 14.3 5.0 - 21.0 ng/mL         Imaging:     Assessment:     # Severe symptomatic anemia from GI loss vs poor intake   # Right lower extremity edema r/o DVT        Plan:     # Severe symptomatic anemia possible from GI loss vs poor intake   - obtain stool occult   - transfuse 1u PRBC  - monitor hgb, transfuse <7  - obtain iron panel, ferritin, vit b12, folate   - put on PPI   - GI consult     # Right lower extremity edema r/o DVT   - obtain doppler US     DVT prop: non due anemia and possible DVT  GI prop: PPI    Code: Full        Signed By: Carol Estrada MD     September 10, 2019

## 2019-09-10 NOTE — PROGRESS NOTES
Admission Medication Reconciliation:    Information obtained from:  Patient  RxQuery data available¹:  YES-limited    Comments/Recommendations: Updated PTA meds/reviewed patient's allergies. Patient is a reliable historian. Medication changes (since last review): Adjusted  Pepto Bismol  MOM  Naproxen sodium    Removed  Ergocalciferol (\"not a pill person\")  Simethicone  DRY EYES ointment (alternate therapy)    Thank you for allowing me to participate in the care of your patient. Johanna ShanksD, RN #[phone] benefit data reflects medications filled and processed through the patient's insurance, however   this data does NOT capture whether the medication was picked up or is currently being taken by the patient. Allergies:  Contuss and Sulfa (sulfonamide antibiotics)    Significant PMH/Disease States:   Past Medical History:   Diagnosis Date    Anemia due to chronic blood loss 3/19/2011    DDD (degenerative disc disease), lumbar     ruptured disc    GI bleed 3/19/2011    Obesity (BMI 30-39. 9)      Chief Complaint for this Admission:    Chief Complaint   Patient presents with    Abnormal Lab Results     Prior to Admission Medications:   Prior to Admission Medications   Prescriptions Last Dose Informant Patient Reported? Taking?   bismuth subsalicylate (PEPTO-BISMOL) 262 mg/15 mL suspension 9/3/2019 at Unknown time  Yes Yes   Sig: Take 30 mL by mouth every four (4) hours as needed for Indigestion. dextran 70-hypromellose (ARTIFICIAL TEARS) ophthalmic solution 9/10/2019 at Unknown time  Yes Yes   Sig: Administer  to both eyes as needed. magnesium hydroxide (NAYAK MILK OF MAGNESIA) 400 mg/5 mL suspension 9/3/2019 at Unknown time  Yes Yes   Sig: Take 30 mL by mouth daily as needed for Constipation. naproxen sodium (ALEVE) 220 mg cap 9/3/2019 at Unknown time  Yes Yes   Sig: Take 1 Cap by mouth daily as needed.    omeprazole (PRILOSEC) 20 mg capsule 9/3/2019 at Unknown time  No Yes Sig: Take 1 Cap by mouth daily. white petrolatum (LACRILUBE) ointment 9/9/2019 at Unknown time  Yes Yes   Sig: Administer  to both eyes nightly. Facility-Administered Medications: None       Please contact the main inpatient pharmacy with any questions or concerns at [phone] and we will direct you to the clinical pharmacist covering this patient's care while in-house.    BOAZ Smalls

## 2019-09-10 NOTE — ROUTINE PROCESS
TRANSFER - OUT REPORT:    Verbal report given to Roque Magana RN(name) on Raghav Ades  being transferred to (unit) for routine progression of care       Report consisted of patients Situation, Background, Assessment and   Recommendations(SBAR). Information from the following report(s) SBAR, ED Summary, Procedure Summary and MAR was reviewed with the receiving nurse. Lines:   Peripheral IV 09/10/19 Left Wrist (Active)   Site Assessment Clean, dry, & intact 9/10/2019 12:30 PM   Phlebitis Assessment 0 9/10/2019 12:30 PM   Infiltration Assessment 0 9/10/2019 12:30 PM   Dressing Status Clean, dry, & intact 9/10/2019 12:30 PM       Peripheral IV 09/10/19 Right Antecubital (Active)   Site Assessment Clean, dry, & intact 9/10/2019 12:37 PM   Phlebitis Assessment 0 9/10/2019 12:37 PM   Infiltration Assessment 0 9/10/2019 12:37 PM   Dressing Status Clean, dry, & intact 9/10/2019 12:37 PM        Opportunity for questions and clarification was provided.       Patient transported with:   Monitor  Registered Nurse

## 2019-09-10 NOTE — PROGRESS NOTES
1645:  Met patient in ED to transport to [facility] for admission. Patient on commode. Pericare completed. Patient assisted back to bed. Hospitalist now at bedside to assess and discuss plan of care for admission. 1700:  Dr. Joel Szymanski, Team 7 Hospitalist contact: [phone]:  Patient transported by this RN via ED stretcher to room 311, blood transfusion infusing.

## 2019-09-10 NOTE — PROGRESS NOTES
TRANSFER - IN REPORT:    Verbal report received from [facility]) on Raghav Ades  being received from ED(unit) for routine progression of care      Report consisted of patients Situation, Background, Assessment and   Recommendations(SBAR). Information from the following report(s) SBAR, Kardex, ED Summary, Intake/Output, MAR, Accordion and Recent Results was reviewed with the receiving nurse. Opportunity for questions and clarification was provided. Assessment completed upon patients arrival to unit and care assumed.

## 2019-09-10 NOTE — ED TRIAGE NOTES
Sent in for low hgb of 6.5.  +fatigue & weakness. +LAURENT. Denies chest pain. Denies dark colored stools, but endorses frequency.

## 2019-09-11 ENCOUNTER — TELEPHONE (OUTPATIENT)
Dept: FAMILY MEDICINE CLINIC | Age: 79
End: 2019-09-11

## 2019-09-11 LAB
ABO + RH BLD: NORMAL
ANION GAP SERPL CALC-SCNC: 9 MMOL/L (ref 5–15)
ATRIAL RATE: 73 BPM
BASOPHILS # BLD: 0 K/UL (ref 0–0.1)
BASOPHILS NFR BLD: 0 % (ref 0–1)
BLD PROD TYP BPU: NORMAL
BLD PROD TYP BPU: NORMAL
BLOOD GROUP ANTIBODIES SERPL: NORMAL
BPU ID: NORMAL
BPU ID: NORMAL
BUN SERPL-MCNC: 11 MG/DL (ref 6–20)
BUN/CREAT SERPL: 21 (ref 12–20)
CALCIUM SERPL-MCNC: 8.8 MG/DL (ref 8.5–10.1)
CALCULATED P AXIS, ECG09: 40 DEGREES
CALCULATED R AXIS, ECG10: 31 DEGREES
CALCULATED T AXIS, ECG11: 28 DEGREES
CHLORIDE SERPL-SCNC: 109 MMOL/L (ref 97–108)
CO2 SERPL-SCNC: 24 MMOL/L (ref 21–32)
CREAT SERPL-MCNC: 0.52 MG/DL (ref 0.55–1.02)
CROSSMATCH RESULT,%XM: NORMAL
CROSSMATCH RESULT,%XM: NORMAL
DIAGNOSIS, 93000: NORMAL
DIFFERENTIAL METHOD BLD: ABNORMAL
EOSINOPHIL # BLD: 0.1 K/UL (ref 0–0.4)
EOSINOPHIL NFR BLD: 2 % (ref 0–7)
ERYTHROCYTE [DISTWIDTH] IN BLOOD BY AUTOMATED COUNT: 25.9 % (ref 11.5–14.5)
GLUCOSE SERPL-MCNC: 102 MG/DL (ref 65–100)
HCT VFR BLD AUTO: 29.4 % (ref 35–47)
HGB BLD-MCNC: 7.8 G/DL (ref 11.5–16)
IMM GRANULOCYTES # BLD AUTO: 0 K/UL
IMM GRANULOCYTES NFR BLD AUTO: 0 %
LYMPHOCYTES # BLD: 1.1 K/UL (ref 0.8–3.5)
LYMPHOCYTES NFR BLD: 23 % (ref 12–49)
MCH RBC QN AUTO: 18.3 PG (ref 26–34)
MCHC RBC AUTO-ENTMCNC: 26.5 G/DL (ref 30–36.5)
MCV RBC AUTO: 68.9 FL (ref 80–99)
MONOCYTES # BLD: 0.4 K/UL (ref 0–1)
MONOCYTES NFR BLD: 9 % (ref 5–13)
NEUTS SEG # BLD: 3.3 K/UL (ref 1.8–8)
NEUTS SEG NFR BLD: 66 % (ref 32–75)
NRBC # BLD: 0 K/UL (ref 0–0.01)
NRBC BLD-RTO: 0 PER 100 WBC
P-R INTERVAL, ECG05: 152 MS
PLATELET # BLD AUTO: 166 K/UL (ref 150–400)
POTASSIUM SERPL-SCNC: 3.6 MMOL/L (ref 3.5–5.1)
Q-T INTERVAL, ECG07: 392 MS
QRS DURATION, ECG06: 86 MS
QTC CALCULATION (BEZET), ECG08: 431 MS
RBC # BLD AUTO: 4.27 M/UL (ref 3.8–5.2)
RBC MORPH BLD: ABNORMAL
SODIUM SERPL-SCNC: 142 MMOL/L (ref 136–145)
SPECIMEN EXP DATE BLD: NORMAL
STATUS OF UNIT,%ST: NORMAL
STATUS OF UNIT,%ST: NORMAL
UNIT DIVISION, %UDIV: 0
UNIT DIVISION, %UDIV: 0
VENTRICULAR RATE, ECG03: 73 BPM
WBC # BLD AUTO: 4.9 K/UL (ref 3.6–11)

## 2019-09-11 PROCEDURE — 65410000002 HC RM PRIVATE OB

## 2019-09-11 PROCEDURE — 74011250636 HC RX REV CODE- 250/636: Performed by: INTERNAL MEDICINE

## 2019-09-11 PROCEDURE — 80048 BASIC METABOLIC PNL TOTAL CA: CPT

## 2019-09-11 PROCEDURE — C9113 INJ PANTOPRAZOLE SODIUM, VIA: HCPCS | Performed by: INTERNAL MEDICINE

## 2019-09-11 PROCEDURE — 74011000250 HC RX REV CODE- 250: Performed by: INTERNAL MEDICINE

## 2019-09-11 PROCEDURE — 74011000258 HC RX REV CODE- 258: Performed by: INTERNAL MEDICINE

## 2019-09-11 PROCEDURE — 85025 COMPLETE CBC W/AUTO DIFF WBC: CPT

## 2019-09-11 PROCEDURE — 36415 COLL VENOUS BLD VENIPUNCTURE: CPT

## 2019-09-11 PROCEDURE — 74011000250 HC RX REV CODE- 250: Performed by: NURSE PRACTITIONER

## 2019-09-11 RX ORDER — ERGOCALCIFEROL 1.25 MG/1
50000 CAPSULE ORAL
Qty: 12 CAP | Refills: 3 | Status: SHIPPED | OUTPATIENT
Start: 2019-09-11 | End: 2020-11-11

## 2019-09-11 RX ADMIN — POLYETHYLENE GLYCOL-3350 AND ELECTROLYTES 2000 ML: 236; 6.74; 5.86; 2.97; 22.74 POWDER, FOR SOLUTION ORAL at 18:28

## 2019-09-11 RX ADMIN — Medication 10 ML: at 22:39

## 2019-09-11 RX ADMIN — Medication 10 ML: at 14:00

## 2019-09-11 RX ADMIN — IRON SUCROSE 200 MG: 20 INJECTION, SOLUTION INTRAVENOUS at 10:52

## 2019-09-11 RX ADMIN — Medication 10 ML: at 22:40

## 2019-09-11 RX ADMIN — SODIUM CHLORIDE 40 MG: 9 INJECTION INTRAMUSCULAR; INTRAVENOUS; SUBCUTANEOUS at 10:52

## 2019-09-11 NOTE — PROGRESS NOTES
Hospitalist Progress Note  Gina Templeton MD  Answering service: [phone] from in house phone      Date of Service:  2019  NAME:  Parminder Castrejon  :  1940  MRN:  [id number]      Admission Summary:   Ms Vani Monreal is a 69 y/o female with Mhx of anemia, previous GI bleed and DDD who presents to the hospital after recent labs work showing hgb 6.6    Interval history / Subjective:   Patient seen and examined this afternoon. She has no new complaints except having pain on the right thigh area. Hgb stable after 1u transfusion. Assessment & Plan:     # Severe symptomatic Iron def anemia possible from GI loss vs poor intake   - obtain stool occult pending   - s/p 1u PRBC transfusion   - monitor hgb, transfuse <7  -  iron panel, ferritin, vit b12, folate- consistent with iron def anemia   - continue PPI   - will give IV Iron X1   - NPO after midnight   - plan for EGD/Colonoscopy tomorrow   - GI consult ronal input   - hemonc consulted for future outpatient IV iron administration      # Right lower extremity DVT   - Doppler US showing  - patient is not a candidate for anticoagulation due to recurrent iron def anemia of unknown cause possible GI loss    - IR consult for IVC filter placement        DVT prop: non due anemia and possible DVT  GI prop: PPI     Code: Full      Hospital Problems  Date Reviewed: 10/12/2015          Codes Class Noted POA    Severe anemia ICD-10-CM: D64.9  ICD-9-CM: 285.9  9/10/2019 Unknown                Review of Systems:   Pertinent positive mentioned in interval hx/HPI. Other systems reviewed and negative     Vital Signs:    Last 24hrs VS reviewed since prior progress note.  Most recent are:  Visit Vitals  /70 (BP 1 Location: Right arm, BP Patient Position: At rest)   Pulse 86   Temp 98.3 °F (36.8 °C)   Resp 16   Ht 5' 3\" (1.6 m)   Wt 96.6 kg (213 lb)   SpO2 99%   BMI 37.73 kg/m²         Intake/Output Summary (Last 24 hours) at 9/11/2019 1852  Last data filed at 9/11/2019 1709  Gross per 24 hour   Intake 150 ml   Output 1300 ml   Net -1150 ml        Physical Examination:             Constitutional:  No acute distress, cooperative, pleasant    ENT:  Oral mucous moist, oropharynx benign. Neck supple,    Resp:  CTA bilaterally. No wheezing/rhonchi/rales. No accessory muscle use   CV:  Regular rhythm, normal rate, no murmurs, gallops, rubs    GI:  Soft, non distended, non tender. normoactive bowel sounds, no hepatosplenomegaly     Musculoskeletal:  No edema, warm, 2+ pulses throughout    Neurologic:  Moves all extremities. AAOx3, CN II-XII reviewed            Data Review:   I personally reviewed labs and imaging       Labs:     Recent Labs     09/11/19  0554 09/10/19  1125   WBC 4.9 5.8   HGB 7.8* 6.0*   HCT 29.4* 25.1*    188     Recent Labs     09/11/19  0553 09/10/19  1125    139   K 3.6 3.6   * 105   CO2 24 26   BUN 11 19   CREA 0.52* 0.56   * 100   CA 8.8 8.7     Recent Labs     09/10/19  1125   SGOT 13*   ALT 14   AP 74   TBILI 1.2*   TP 7.5   ALB 3.3*   GLOB 4.2*     Recent Labs     09/10/19  1125   INR 1.0   PTP 10.1      Recent Labs     09/10/19  1125   TIBC 516*   PSAT 2*   FERR 7*      Lab Results   Component Value Date/Time    Folate 14.3 09/10/2019 11:25 AM      No results for input(s): PH, PCO2, PO2 in the last 72 hours. No results for input(s): CPK, CKNDX, TROIQ in the last 72 hours.     No lab exists for component: CPKMB  Lab Results   Component Value Date/Time    Cholesterol, total 147 09/06/2019 02:52 PM    HDL Cholesterol 66 09/06/2019 02:52 PM    LDL, calculated 66 09/06/2019 02:52 PM    Triglyceride 77 09/06/2019 02:52 PM     Lab Results   Component Value Date/Time    Glucose (POC) 167 (H) 03/19/2011 12:14 PM     Lab Results   Component Value Date/Time    Color Yellow 03/18/2011 11:25 AM    Appearance Cloudy (A) 03/18/2011 11:25 AM    pH (UA) 5.5 03/18/2011 11:25 AM Ketone Negative 03/18/2011 11:25 AM    Bilirubin Negative 03/18/2011 11:25 AM    Nitrites Negative 03/18/2011 11:25 AM    Leukocyte Esterase Negative 03/18/2011 11:25 AM    Bacteria Few 03/18/2011 11:25 AM    WBC 0-5 03/18/2011 11:25 AM    RBC 4-10 (A) 03/18/2011 11:25 AM         Medications Reviewed:     Current Facility-Administered Medications   Medication Dose Route Frequency    peg 3350-electrolytes (COLYTE) 4000 mL  2,000 mL Oral BID    0.9% sodium chloride infusion 250 mL  250 mL IntraVENous PRN    sodium chloride (NS) flush 5-40 mL  5-40 mL IntraVENous Q8H    sodium chloride (NS) flush 5-40 mL  5-40 mL IntraVENous PRN    acetaminophen (TYLENOL) tablet 650 mg  650 mg Oral Q4H PRN    sodium chloride (NS) flush 5-40 mL  5-40 mL IntraVENous Q8H    sodium chloride (NS) flush 5-40 mL  5-40 mL IntraVENous PRN    pantoprazole (PROTONIX) 40 mg in sodium chloride 0.9% 10 mL injection  40 mg IntraVENous DAILY AFTER BREAKFAST     ______________________________________________________________________  EXPECTED LENGTH OF STAY: 2d 16h  ACTUAL LENGTH OF STAY:          1                 Chloe Valadez MD   Patient has given Verbal permission to discuss medical care with   persons present in the room and and also with contact as listed on face sheet.

## 2019-09-11 NOTE — TELEPHONE ENCOUNTER
Called, left vm for pt to return call to office. Called x 3 and message left. Made NP aware letter sent out to patient.

## 2019-09-11 NOTE — CONSULTS
[address] [place] [address] NOTE  [facility]  [phone] NP in-hospital cell phone M-F until 4:30  After 5pm or on weekends, please call  for physician on call        NAME:  Levi Woods   :   1940   MRN:   [id number]       Referring Physician: Kaycee Shane    Consult Date: 2019 9:32 AM    Chief Complaint: iron deficiency anemia     History of Present Illness:  Patient is a 70 y.o. who is seen in consultation at the request of Dr. Kaycee Shane for iron deficiency anemia. Medical history as listed below includes iron deficiency anemia. Patient presented for symptomatic anemia - weakness, shortness of breath, and fatigue. She reports chronic acid reflux, chronic dysphagia, IBS, crampy abdominal pain, and alternating constipation/diarrhea. She denies melena or hematochezia. She has 50 pound intentional weight loss over the past 5 years. NSAID's <1x/month. No alcohol or tobacco.  EGD 2011 by Dr. Jenelle Reeder: large 6-7 cm fixed, sliding hiatal hernia  M2A  by Dr. Jenelle Reeder: normal  Colonoscopy     I have reviewed the emergency room note, hospital admission note, notes by all other clinicians who have seen the patient during this hospitalization to date. I have reviewed the problem list and the reason for this hospitalization. I have reviewed the allergies and the medications the patient was taking at home prior to this hospitalization. PMH:  Past Medical History:   Diagnosis Date    Anemia due to chronic blood loss 3/19/2011    DDD (degenerative disc disease), lumbar     ruptured disc    GI bleed 3/19/2011    Obesity (BMI 30-39. 9)        PSH:  Past Surgical History:   Procedure Laterality Date    DEXA BONE DENSITY STUDY AXIAL  11    nml    HX CATARACT REMOVAL      left, with lens    HX CHOLECYSTECTOMY      HX COLONOSCOPY  3/11    HX HEENT      deviated septum repair    HX ORTHOPAEDIC  1984    right elbow fracture repair    HX ORTHOPAEDIC  2011    right elbow, carpal tunnel    HX TONSILLECTOMY      SOY MAMMOGRAM DIGITAL BILATERAL  4/5/12    nml    NEUROLOGICAL PROCEDURE UNLISTED  2009    ulnar release--right elbow    TOTAL HIP ARTHROPLASTY  1998    left       Allergies: Allergies   Allergen Reactions    Contuss Unknown (comments)     Unknown      Sulfa (Sulfonamide Antibiotics) Hives and Swelling       Home Medications:  Prior to Admission Medications   Prescriptions Last Dose Informant Patient Reported? Taking?   bismuth subsalicylate (PEPTO-BISMOL) 262 mg/15 mL suspension 9/3/2019 at Unknown time  Yes Yes   Sig: Take 30 mL by mouth every four (4) hours as needed for Indigestion. dextran 70-hypromellose (ARTIFICIAL TEARS) ophthalmic solution 9/10/2019 at Unknown time  Yes Yes   Sig: Administer  to both eyes as needed. ergocalciferol (ERGOCALCIFEROL) 50,000 unit capsule   No No   Sig: Take 1 Cap by mouth every seven (7) days. magnesium hydroxide (NAYAK MILK OF MAGNESIA) 400 mg/5 mL suspension 9/3/2019 at Unknown time  Yes Yes   Sig: Take 30 mL by mouth daily as needed for Constipation. naproxen sodium (ALEVE) 220 mg cap 9/3/2019 at Unknown time  Yes Yes   Sig: Take 1 Cap by mouth daily as needed. omeprazole (PRILOSEC) 20 mg capsule 9/3/2019 at Unknown time  No Yes   Sig: Take 1 Cap by mouth daily. white petrolatum (LACRILUBE) ointment 9/9/2019 at Unknown time  Yes Yes   Sig: Administer  to both eyes nightly.       Facility-Administered Medications: None       Hospital Medications:  Current Facility-Administered Medications   Medication Dose Route Frequency    iron sucrose (VENOFER) 200 mg in 0.9% sodium chloride 100 mL IVPB  200 mg IntraVENous ONCE    0.9% sodium chloride infusion 250 mL  250 mL IntraVENous PRN    sodium chloride (NS) flush 5-40 mL  5-40 mL IntraVENous Q8H    sodium chloride (NS) flush 5-40 mL  5-40 mL IntraVENous PRN    acetaminophen (TYLENOL) tablet 650 mg  650 mg Oral Q4H PRN    sodium chloride (NS) flush 5-40 mL  5-40 mL IntraVENous Q8H    sodium chloride (NS) flush 5-40 mL  5-40 mL IntraVENous PRN    pantoprazole (PROTONIX) 40 mg in sodium chloride 0.9% 10 mL injection  40 mg IntraVENous DAILY AFTER BREAKFAST       Social History:  Social History     Tobacco Use    Smoking status: Never Smoker    Smokeless tobacco: Never Used   Substance Use Topics    Alcohol use: No       Family History:  Family History   Problem Relation Age of Onset    Alcohol abuse Father     COPD Father     Cancer Brother         bladder    Alcohol abuse Sister     Dementia Sister     Heart Disease Brother         PAD    Heart Disease Brother        Review of Systems:  Constitutional: negative fever, +chills, negative weight loss  Eyes:   +visual changes  ENT:   negative sore throat, tongue or lip swelling  Respiratory:  negative cough, negative dyspnea  Cards:  negative for chest pain, palpitations, lower extremity edema  GI:   See HPI  :  negative for frequency, dysuria  Integument:  +for rash and pruritus  Heme:  +for easy bruising and gum/nose bleeding  Musculoskeletal:+for myalgias, back pain and muscle weakness  Neuro:  negative for dizziness, vertigo; +headaches  Psych:  negative for feelings of anxiety,+ depression     Objective:     Patient Vitals for the past 8 hrs:   BP Temp Pulse Resp SpO2   09/11/19 0849 116/64 97.6 °F (36.4 °C) 64 16 99 %     09/11 0701 - 09/11 1900  In: -   Out: 850 [Urine:850]  09/09 1901 - 09/11 0700  In: 483.8 [P.O.:150]  Out: -     EXAM:     CONST:  Pleasant lady lying in bed, no acute distress   NEURO:  alert and oriented x 3   HEENT: EOMI, no scleral icterus   LUNGS: clear to ausculation, (-) wheeze   CARD:  S1 S2   ABD:  soft, mild lower abdominal tenderness, no rebound, bowel sounds (+) all 4 quadrants, no masses, non distended   EXT:  edema, warm   PSYCH: full, not anxious     Data Review     Recent Labs 09/11/19  0554 09/10/19  1125   WBC 4.9 5.8   HGB 7.8* 6.0*   HCT 29.4* 25.1*    188     Recent Labs     09/11/19  0553 09/10/19  1125    139   K 3.6 3.6   * 105   CO2 24 26   BUN 11 19   CREA 0.52* 0.56   * 100   CA 8.8 8.7     Recent Labs     09/10/19  1125   SGOT 13*   AP 74   TP 7.5   ALB 3.3*   GLOB 4.2*     Recent Labs     09/10/19  1125   INR 1.0   PTP 10.1          Assessment:     · Iron deficiency anemia: hgb 6.5-->7.8 status post 2 units pRBC's     Patient Active Problem List   Diagnosis Code    GERD (gastroesophageal reflux disease) K21.9    Ghotra's esophagus K22.70    Dry eyes H04.123    TMJ arthralgia M26.629    Rosacea L71.9    Allergic rhinitis J30.9    Headache(784.0) R51    Insomnia G47.00    Cataract H26.9    Osteoarthritis M19.90    Diverticula of colon K57.30    IBS (irritable bowel syndrome) K58.9    Severe obesity ([address] E66.01    Severe anemia D64.9     Plan:     · Plan for EGD/colonoscopy tomorrow  · Clear liquid diet, NPO midnight  · Prep this evening  · Agree with BID PPI  · IV iron  · Trend CBC, transfuse as necessary  · Hematology consulted  · Patient discussed with and will be seen by Dr. Robbie Taylor  · Thank you for allowing me to participate in care of Ezio Peguero     Signed By: Juan Valladares NP     9/11/2019  9:32 AM       Gastroenterology Attending Physician attestation statement and comments. This patient was seen and examined by me in a face-to-face visit today. I reviewed the medical record including lab work, imaging and other provider notes. I confirmed the history as described above. I spoke to the patient, reviewed the medical record including lab work, imaging and other provider notes. I discussed this case in detail with Harmeet PRABHAKAR. I formulated an  assessment of this patient and developed a treatment plan. I agree with the above consultation note.  I agree with the history, exam and assessment and plan as outlined in the note.  I would like to add the following:    Abd: normoactive BS, nt, nd, no rebound/guarding. Progressive iron deficiency anemia. Differential includes ulcers, erosions, AVMs, or malignancy. Plan for EGD and colonoscopy tomorrow.     Dr. Imani Hill

## 2019-09-11 NOTE — ACP (ADVANCE CARE PLANNING)
Advance Care Planning Note    Name: Janalee Gottron  YOB: 1940  MRN: [id number]  Admission Date: 9/10/2019 11:10 AM    Date of discussion: 9/11/2019    Active Diagnoses:    Hospital Problems  Date Reviewed: 10/12/2015          Codes Class Noted POA    Severe anemia ICD-10-CM: D64.9  ICD-9-CM: 285.9  9/10/2019 Unknown               These active diagnoses are of sufficient risk that focused discussion on advance care planning is indicated in order to allow the patient to thoughtfully consider personal goals of care, and if situations arise that prevent the ability to personally give input, to ensure appropriate representation of their personal desires for different levels and aggressiveness of care. Discussion:     Persons present and participating in discussion: Anca Sim MD,     Discussion: Discussion was made regarding code status. Patient wants to be full code but she doesn't want to remain on a ventilator and be on a vegetative state. She also doesn't want life prolonging intervention like dialysis if needed. She wants her daughter Mrs Ayala Vaz to be the mPOA or decision make if she is unable to do so. Next in line she wants her son in law( Sheldon 13 ) to be the mPOA if her daughter is unable to do so. Time Spent:     Total time spent face-to-face in education and discussion: 16 minutes.      Hebert Jara MD  9/11/2019  11:08 AM

## 2019-09-11 NOTE — CONSULTS
[facility] at [address], [address] [place]: [phone]  F: [phone]    Reason for Visit:   Raghav Marie is a 70 y.o. female who is seen in consultation at the request of Dr. Svetlana Jordan for evaluation of anemia. Treatment History:   None from us  Prior oral iron but not able to tolerate    History of Present Illness:     Pt seen today in hospital consult for iron def anemia. Pt reports a history of iron def anemia in past with GI eval done and bleeding source not clear. Pt states she has been tired and fatigued lately and knew her iron level was down again. Pt states she does not like going to doctors so delay going for eval.   hgb down to 6 on admit. Sat 2% and ferritin 7. Wbc and platelets normal.   Pt also relates a lump on right groin with RLE edema for unclear amount of time. Ultrasound shows thrombus in right greater saphenous thigh vein. Pt states she is for \"filter\". Pt is in bed at my visits. No family present. Denies pain. States not as mobile lately. Past Medical History:   Diagnosis Date    Anemia due to chronic blood loss 3/19/2011    DDD (degenerative disc disease), lumbar     ruptured disc    GI bleed 3/19/2011    Obesity (BMI 30-39. 9)       Past Surgical History:   Procedure Laterality Date    DEXA BONE DENSITY STUDY AXIAL  4/12/11    nml    HX CATARACT REMOVAL  2008    left, with lens    HX CHOLECYSTECTOMY      HX COLONOSCOPY  3/11    HX HEENT      deviated septum repair    HX ORTHOPAEDIC  1984    right elbow fracture repair    HX ORTHOPAEDIC  2011    right elbow, carpal tunnel    HX TONSILLECTOMY      SOY MAMMOGRAM DIGITAL BILATERAL  4/5/12    nml    NEUROLOGICAL PROCEDURE UNLISTED  2009    ulnar release--right elbow    TOTAL HIP ARTHROPLASTY  1998    left      Social History     Tobacco Use    Smoking status: Never Smoker    Smokeless tobacco: Never Used   Substance Use Topics    Alcohol use: No      Family History   Problem Relation Age of Onset    Alcohol abuse Father     COPD Father     Cancer Brother         bladder    Alcohol abuse Sister     Dementia Sister     Heart Disease Brother         PAD    Heart Disease Brother      Current Facility-Administered Medications   Medication Dose Route Frequency    peg 3350-electrolytes (COLYTE) 4000 mL  2,000 mL Oral BID    0.9% sodium chloride infusion 250 mL  250 mL IntraVENous PRN    sodium chloride (NS) flush 5-40 mL  5-40 mL IntraVENous Q8H    sodium chloride (NS) flush 5-40 mL  5-40 mL IntraVENous PRN    acetaminophen (TYLENOL) tablet 650 mg  650 mg Oral Q4H PRN    sodium chloride (NS) flush 5-40 mL  5-40 mL IntraVENous Q8H    sodium chloride (NS) flush 5-40 mL  5-40 mL IntraVENous PRN    pantoprazole (PROTONIX) 40 mg in sodium chloride 0.9% 10 mL injection  40 mg IntraVENous DAILY AFTER BREAKFAST      Allergies   Allergen Reactions    Contuss Unknown (comments)     Unknown      Sulfa (Sulfonamide Antibiotics) Hives and Swelling        Review of Systems: A complete review of systems was obtained, negative except as described above. Physical Exam:     Visit Vitals  /64 (BP 1 Location: Right arm, BP Patient Position: At rest)   Pulse 64   Temp 97.6 °F (36.4 °C)   Resp 16   Ht 5' 3\" (1.6 m)   Wt 213 lb (96.6 kg)   SpO2 99%   BMI 37.73 kg/m²     ECOG PS: 2  General: No distress  Eyes:  anicteric sclerae  HENT: Atraumatic  Neck: Supple  Respiratory: CTAB, normal respiratory effort  CV: Normal rate, regular rhythm  GI: Soft, nontender, nondistended  MS:in bed, with RLE larger than left  Skin: No rashes, ecchymoses, or petechiae. Normal temperature, turgor, and texture.   Psych: alert, conversant  Right groin/ thigh without palpable masses    Results:     Lab Results   Component Value Date/Time    WBC 4.9 09/11/2019 05:54 AM    HGB 7.8 (L) 09/11/2019 05:54 AM    HCT 29.4 (L) 09/11/2019 05:54 AM    PLATELET 961 49/91/0960 05:54 AM    MCV 68.9 (L) 09/11/2019 05:54 AM    ABS. NEUTROPHILS 3.3 09/11/2019 05:54 AM    Hemoglobin (POC) 7.1 (L) 03/19/2011 12:14 PM    Hematocrit (POC) 21 (L) 03/19/2011 12:14 PM     Lab Results   Component Value Date/Time    Sodium 142 09/11/2019 05:53 AM    Potassium 3.6 09/11/2019 05:53 AM    Chloride 109 (H) 09/11/2019 05:53 AM    CO2 24 09/11/2019 05:53 AM    Glucose 102 (H) 09/11/2019 05:53 AM    BUN 11 09/11/2019 05:53 AM    Creatinine 0.52 (L) 09/11/2019 05:53 AM    GFR est AA >60 09/11/2019 05:53 AM    GFR est non-AA >60 09/11/2019 05:53 AM    Calcium 8.8 09/11/2019 05:53 AM    Sodium (POC) 137 03/19/2011 12:14 PM    Potassium (POC) 3.6 03/19/2011 12:14 PM    Chloride (POC) 103 03/19/2011 12:14 PM    Glucose (POC) 167 (H) 03/19/2011 12:14 PM    BUN (POC) 11 03/19/2011 12:14 PM    Creatinine (POC) <0.2 (L) 03/19/2011 12:14 PM    Calcium, ionized (POC) 1.09 (L) 03/19/2011 12:14 PM     Lab Results   Component Value Date/Time    Bilirubin, total 1.2 (H) 09/10/2019 11:25 AM    ALT (SGPT) 14 09/10/2019 11:25 AM    AST (SGOT) 13 (L) 09/10/2019 11:25 AM    Alk. phosphatase 74 09/10/2019 11:25 AM    Protein, total 7.5 09/10/2019 11:25 AM    Albumin 3.3 (L) 09/10/2019 11:25 AM    Globulin 4.2 (H) 09/10/2019 11:25 AM         Records reviewed and summarized above. Pathology report(s) reviewed above. Radiology report(s) reviewed above. Assessment/PLAN:     1)  Recurrent iron deficiency anemia  Prior hx of iron def with GI w/u in past.   Pt is for GI w/u again now. Unclear if source found in past but pt does not remember. Pt states she could not take outpt oral iron / could not tolerate. eval for blood loss source now. Can do anemia w/u. Replace IV iron as doing. Agree with plan. 2) right greater saphenous thigh vein thrombus. Likely due to transient thigh mass pt reports. Incidental finding of prominent right groin LNs. Could do further eval with CT A/P. For IVC filter per pt.    Cannot anticoagulate with NORM.     We will follow  Call if questions  I appreciate the opportunity to participate in MsNicky Chris Vidal's care.     Signed By: Jordon Zeng,

## 2019-09-11 NOTE — PROGRESS NOTES
Bedside shift change report given to GLENN Chung Rn (oncoming nurse) by Maryann Cramer RN (offgoing nurse). Report included the following information SBAR, Kardex, ED Summary, Procedure Summary, Intake/Output and MAR.     0340: RN at bedside. Pt states that she is unable to sleep d/t severe back pain. RN asked pt if she would like Tylenol as per order, pt declined and stated that tylenol never works for her. RN asked pt what type of medication does work, and pt declined any medication at this time. RN also offered heat pack, pt declined. RN helped to reposition pt in bed with pillows, pt stated she was much more comfortable. RN will continue to monitor.

## 2019-09-12 ENCOUNTER — ANESTHESIA (OUTPATIENT)
Dept: ENDOSCOPY | Age: 79
DRG: 378 | End: 2019-09-12
Payer: MEDICARE

## 2019-09-12 ENCOUNTER — ANESTHESIA EVENT (OUTPATIENT)
Dept: ENDOSCOPY | Age: 79
DRG: 378 | End: 2019-09-12
Payer: MEDICARE

## 2019-09-12 VITALS
OXYGEN SATURATION: 97 % | WEIGHT: 213 LBS | SYSTOLIC BLOOD PRESSURE: 120 MMHG | BODY MASS INDEX: 37.74 KG/M2 | DIASTOLIC BLOOD PRESSURE: 68 MMHG | RESPIRATION RATE: 19 BRPM | TEMPERATURE: 97.5 F | HEART RATE: 69 BPM | HEIGHT: 63 IN

## 2019-09-12 LAB
ANION GAP SERPL CALC-SCNC: 11 MMOL/L (ref 5–15)
BUN SERPL-MCNC: 7 MG/DL (ref 6–20)
BUN/CREAT SERPL: 13 (ref 12–20)
CALCIUM SERPL-MCNC: 9.2 MG/DL (ref 8.5–10.1)
CHLORIDE SERPL-SCNC: 105 MMOL/L (ref 97–108)
CO2 SERPL-SCNC: 24 MMOL/L (ref 21–32)
CREAT SERPL-MCNC: 0.54 MG/DL (ref 0.55–1.02)
ERYTHROCYTE [DISTWIDTH] IN BLOOD BY AUTOMATED COUNT: 26.7 % (ref 11.5–14.5)
GLUCOSE SERPL-MCNC: 103 MG/DL (ref 65–100)
HCT VFR BLD AUTO: 33.8 % (ref 35–47)
HEMOCCULT STL QL: NEGATIVE
HGB BLD-MCNC: 8.9 G/DL (ref 11.5–16)
MCH RBC QN AUTO: 18.4 PG (ref 26–34)
MCHC RBC AUTO-ENTMCNC: 26.3 G/DL (ref 30–36.5)
MCV RBC AUTO: 70 FL (ref 80–99)
NRBC # BLD: 0 K/UL (ref 0–0.01)
NRBC BLD-RTO: 0 PER 100 WBC
PLATELET # BLD AUTO: 173 K/UL (ref 150–400)
POTASSIUM SERPL-SCNC: 3.4 MMOL/L (ref 3.5–5.1)
RBC # BLD AUTO: 4.83 M/UL (ref 3.8–5.2)
SODIUM SERPL-SCNC: 140 MMOL/L (ref 136–145)
WBC # BLD AUTO: 4.8 K/UL (ref 3.6–11)

## 2019-09-12 PROCEDURE — C9113 INJ PANTOPRAZOLE SODIUM, VIA: HCPCS | Performed by: INTERNAL MEDICINE

## 2019-09-12 PROCEDURE — 74011250636 HC RX REV CODE- 250/636: Performed by: INTERNAL MEDICINE

## 2019-09-12 PROCEDURE — 76040000007: Performed by: INTERNAL MEDICINE

## 2019-09-12 PROCEDURE — 77030021593 HC FCPS BIOP ENDOSC BSC -A: Performed by: INTERNAL MEDICINE

## 2019-09-12 PROCEDURE — 88305 TISSUE EXAM BY PATHOLOGIST: CPT

## 2019-09-12 PROCEDURE — 85027 COMPLETE CBC AUTOMATED: CPT

## 2019-09-12 PROCEDURE — 74011000250 HC RX REV CODE- 250: Performed by: INTERNAL MEDICINE

## 2019-09-12 PROCEDURE — 80048 BASIC METABOLIC PNL TOTAL CA: CPT

## 2019-09-12 PROCEDURE — 82272 OCCULT BLD FECES 1-3 TESTS: CPT

## 2019-09-12 PROCEDURE — 0DB98ZX EXCISION OF DUODENUM, VIA NATURAL OR ARTIFICIAL OPENING ENDOSCOPIC, DIAGNOSTIC: ICD-10-PCS | Performed by: INTERNAL MEDICINE

## 2019-09-12 PROCEDURE — 0DB68ZX EXCISION OF STOMACH, VIA NATURAL OR ARTIFICIAL OPENING ENDOSCOPIC, DIAGNOSTIC: ICD-10-PCS | Performed by: INTERNAL MEDICINE

## 2019-09-12 PROCEDURE — 76060000032 HC ANESTHESIA 0.5 TO 1 HR: Performed by: INTERNAL MEDICINE

## 2019-09-12 PROCEDURE — 36415 COLL VENOUS BLD VENIPUNCTURE: CPT

## 2019-09-12 PROCEDURE — 74011000250 HC RX REV CODE- 250: Performed by: NURSE ANESTHETIST, CERTIFIED REGISTERED

## 2019-09-12 PROCEDURE — 0DJD8ZZ INSPECTION OF LOWER INTESTINAL TRACT, VIA NATURAL OR ARTIFICIAL OPENING ENDOSCOPIC: ICD-10-PCS | Performed by: INTERNAL MEDICINE

## 2019-09-12 PROCEDURE — 74011250636 HC RX REV CODE- 250/636: Performed by: NURSE ANESTHETIST, CERTIFIED REGISTERED

## 2019-09-12 RX ORDER — LIDOCAINE HYDROCHLORIDE 20 MG/ML
INJECTION, SOLUTION EPIDURAL; INFILTRATION; INTRACAUDAL; PERINEURAL AS NEEDED
Status: DISCONTINUED | OUTPATIENT
Start: 2019-09-12 | End: 2019-09-12 | Stop reason: HOSPADM

## 2019-09-12 RX ORDER — BISMUTH SUBSALICYLATE 262 MG/15ML
30 LIQUID ORAL
Status: DISCONTINUED | OUTPATIENT
Start: 2019-09-12 | End: 2019-09-12 | Stop reason: HOSPADM

## 2019-09-12 RX ORDER — SODIUM CHLORIDE 9 MG/ML
INJECTION, SOLUTION INTRAVENOUS
Status: DISCONTINUED | OUTPATIENT
Start: 2019-09-12 | End: 2019-09-12 | Stop reason: HOSPADM

## 2019-09-12 RX ORDER — PROPOFOL 10 MG/ML
INJECTION, EMULSION INTRAVENOUS AS NEEDED
Status: DISCONTINUED | OUTPATIENT
Start: 2019-09-12 | End: 2019-09-12 | Stop reason: HOSPADM

## 2019-09-12 RX ADMIN — SODIUM CHLORIDE: 900 INJECTION, SOLUTION INTRAVENOUS at 11:05

## 2019-09-12 RX ADMIN — Medication 10 ML: at 06:00

## 2019-09-12 RX ADMIN — LIDOCAINE HYDROCHLORIDE 50 MG: 20 INJECTION, SOLUTION EPIDURAL; INFILTRATION; INTRACAUDAL; PERINEURAL at 11:12

## 2019-09-12 RX ADMIN — PROPOFOL 20 MG: 10 INJECTION, EMULSION INTRAVENOUS at 11:40

## 2019-09-12 RX ADMIN — PROPOFOL 30 MG: 10 INJECTION, EMULSION INTRAVENOUS at 11:31

## 2019-09-12 RX ADMIN — PROPOFOL 20 MG: 10 INJECTION, EMULSION INTRAVENOUS at 11:44

## 2019-09-12 RX ADMIN — PROPOFOL 30 MG: 10 INJECTION, EMULSION INTRAVENOUS at 11:13

## 2019-09-12 RX ADMIN — PROPOFOL 20 MG: 10 INJECTION, EMULSION INTRAVENOUS at 11:37

## 2019-09-12 RX ADMIN — PROPOFOL 80 MG: 10 INJECTION, EMULSION INTRAVENOUS at 11:12

## 2019-09-12 RX ADMIN — POLYETHYLENE GLYCOL-3350 AND ELECTROLYTES 2000 ML: 236; 6.74; 5.86; 2.97; 22.74 POWDER, FOR SOLUTION ORAL at 00:26

## 2019-09-12 RX ADMIN — PROPOFOL 30 MG: 10 INJECTION, EMULSION INTRAVENOUS at 11:17

## 2019-09-12 RX ADMIN — PROPOFOL 30 MG: 10 INJECTION, EMULSION INTRAVENOUS at 11:21

## 2019-09-12 RX ADMIN — PROPOFOL 30 MG: 10 INJECTION, EMULSION INTRAVENOUS at 11:15

## 2019-09-12 RX ADMIN — PROPOFOL 30 MG: 10 INJECTION, EMULSION INTRAVENOUS at 11:19

## 2019-09-12 RX ADMIN — PROPOFOL 30 MG: 10 INJECTION, EMULSION INTRAVENOUS at 11:24

## 2019-09-12 RX ADMIN — PROPOFOL 30 MG: 10 INJECTION, EMULSION INTRAVENOUS at 11:27

## 2019-09-12 RX ADMIN — PROPOFOL 20 MG: 10 INJECTION, EMULSION INTRAVENOUS at 11:35

## 2019-09-12 RX ADMIN — SODIUM CHLORIDE 40 MG: 9 INJECTION INTRAMUSCULAR; INTRAVENOUS; SUBCUTANEOUS at 10:13

## 2019-09-12 NOTE — PROCEDURES
[facility] 9[address] Robbie Taylor M.D.  [facility], [address] [place] [address]  [phone]               Esophagogastroduodenoscopy (EGD) Procedure Note    NAME: Ezio Peguero  :  1940  MRN:  [id number]    Indications:  Iron deficiency anemia     : Dinah Saldaña MD    Referring Provider:  Selene East NP    Medicine:  MAC anesthesia      Procedure Details:  After informed consent was obtained with all risks and benefits of the procedure explained and preprocedure exam completed, the patient was placed in the left lateral decubitus position. Universal protocol for patient identification was performed and documented in the nursing notes. Throughout the procedure, the patient's blood pressure was monitored at least every five minutes; pulse, and oxygen saturations were monitored continuously. All vital signs were documented in the nursing notes. The endoscope was inserted into the mouth and advanced under direct vision to second portion of the duodenum. A careful inspection was made as the gastroscope was withdrawn, including a retroflexed view of the proximal stomach; findings and interventions are described below. Findings:   Esophagus:normal  Stomach: large 10 cm hiatal hernia with associated mild Jayson erosions s/p biopsies, 4 mm sessile polyp in the body s/p cold forceps polypectomy  Duodenum: normal s/p biopsies for celiac disease    Interventions:    biopsy of stomach and duodenum    Specimens:   ID Type Source Tests Collected by Time Destination   1 : Duodenum Rule Out Celiac Disease Preservative   Tatiana Ramirez MD 2019 1116 Pathology   2 : Gastric Rule Out H Pylori Preservative   Tatiana Ramirez MD 2019 1124 Pathology   3 : Gastric Polyp Preservative   Tatiana Ramirez MD 2019 1124 Pathology        EBL: None          Complications:     No immediate complications        Impression:  -See post-procedure diagnoses.  Possible iron deficiency anemia due to [facility] erosions. Recommendations:  -Await pathology. -IV iron by Heme    Signed by:  Kiya Mae MD         9/12/2019 11:49 AM

## 2019-09-12 NOTE — PROGRESS NOTES
Oris Look  1940  [id number]    Situation:    Scheduled Procedure: Procedure(s):  ESOPHAGOGASTRODUODENOSCOPY (EGD)  COLONOSCOPY  Verbal report received from: Heron Stephen RN  Preoperative diagnosis: Anemia    Background:    Procedure: Procedure(s):  ESOPHAGOGASTRODUODENOSCOPY (EGD)  COLONOSCOPY  Physician performing procedure; Dr. Claudell Spring RN    NPO Status/Last PO Intake: NPO since MN  Is the patient taking Blood Thinners: no  Is the patient diabetic: no  Does the patient have a Pacemaker/Defibrillator in place?: no  Does the patient need antibiotics before/during/after procedure: If the patient is having a colon, How much prep was drank? all  What were the Colon prep results? Clear iquid  Does the patient have SCD in place: no  Is patient on CONTACT precautions: no    Assessment:  Are the vital signs stable prior to patient coming to ENDO? yes  Is the patient alert/oriented and able to sign consent for the procedures: yes  How does the patient's abdomen feel prior to coming to ENDO? round and soft  Does the patient have a patient IV in place?  yes    Recommendation:  Family or Friend present: no  Permission to share finding with Family or Friend: n/a

## 2019-09-12 NOTE — CDMP QUERY
Pt admitted with weakness and fatigue and has iron deficiency anemia documented. Please further specify type and acuity of anemia in the medical record.     => Iron deficiency anemia due to acute blood loss  => Iron deficiency anemia due to chronic blood loss  => Iron deficiency anemia due to multifactorial conditions (acute or chronic blood loss & poor intake)  => Other, please specify  Clinically unable to determine    The medical record reflects the following:       Risk Factors: Pt with hx of iron deficiency anemia, iron level 12, Iron sat 2, Ferritin 7 & TIBC 516 on admission        Clinical Indicators: PT presented with symptoms of fatigue and general weakness. Does not take iron supplements due to gi discomfort when taking pills. HGB on admission 6.0 & 8.9 up to 09/12. EGD showed mild Jayson erosions. [place] Possible iron deficiency anemia due to [place] erosions       Treatment: 2 units PRBCs, GI consult, EGD & colonoscopy, Labs-iron panel.     Thank you    Sam Ahmadi, MSN, RN, [facility], [address] [place]  [phone]

## 2019-09-12 NOTE — PROGRESS NOTES
Hospital follow-up PCP transitional care appointment has been scheduled with Karen Choi NP for Wednesday, 9/18/19 at 8:30 a.m. Pending patient discharge.   Philip Cordero, Care Management Specialist.

## 2019-09-12 NOTE — PROCEDURES
[facility] [place] [address] Robbie Taylor M.D.  [address] [place] [address]  [phone]               Colonoscopy Procedure Note    NAME: Ezio Peguero  :  1940  MRN:  [id number]    Indications:   Iron deficiency anemia     : Dinah Saldaña MD    Referring Provider:  Selene East NP    Medicines:  MAC anesthesia      Procedure Details:  After informed consent was obtained with all risks and benefits of the procedure explained and preprocedure exam completed, the patient was placed in the left lateral decubitus position. Universal protocol for patient identification was performed and documented in the nursing notes. Throughout the procedure, the patient's blood pressure was monitored at least every five minutes; pulse, and oxygen saturations were monitored continuously. All vital signs were documented in the nursing notes. A digital rectal exam was performed and was normal.  The Olympus videocolonoscope  was inserted in the rectum and carefully advanced to the terminal ileum. The colonoscope was slowly withdrawn with careful evaluation between folds. Retroflexion in the rectum was performed; findings and interventions are described below. Procedure start time, extent reached time/cecum time, and procedure end time are documented in the nursing notes. The quality of preparation was good. Findings:   Rectum: Grade small internal hemorrhoid(s);   Sigmoid:     - Diverticulosis-moderate  Descending Colon: normal  Transverse Colon: normal  Ascending Colon: normal  Cecum: normal  Terminal Ileum: normal    Interventions:    none    Specimens:   ID Type Source Tests Collected by Time Destination   1 : Duodenum Rule Out Celiac Disease Preservative   Tatiana Ramirez MD 2019 1116 Pathology   2 : Gastric Rule Out H Pylori Prestavoative   Tatiana Ramirez MD 2019 1124 Pathology   3 : Gastric Polyp Preservative   Tatiana Ramirez MD 2019 1124 Pathology EBL:  None. Complications:   No immediate complications     Impression:  -Moderate colonic diverticulosis involving the sigmoid.   -Small internal hemorrhoids. Recommendations:   No further colonoscopies unless clinical indication. -GI lite diet. Will sign off. Please call with any questions. Setup outpatient GI follow-up with Dr. Dinora Jc. Signed by:  Kiya Mae MD          9/12/2019  11:57 AM

## 2019-09-12 NOTE — DISCHARGE SUMMARY
Discharge Summary       PATIENT ID: Jeff Luna  MRN: [id number]   YOB: 1940    DATE OF ADMISSION: 9/10/2019 11:10 AM    DATE OF DISCHARGE: 09/12/19  PRIMARY CARE PROVIDER: Monse Brambila NP       DISCHARGING PROVIDER: Hermila Prieto MD    To contact this individual call [phone] and ask the  to page. If unavailable ask to be transferred the Adult Hospitalist Department. CONSULTATIONS: IP CONSULT TO GASTROENTEROLOGY  IP CONSULT TO HEMATOLOGY      PROCEDURES/SURGERIES: Procedure(s):  ESOPHAGOGASTRODUODENOSCOPY (EGD)  COLONOSCOPY  ESOPHAGOGASTRODUODENAL (EGD) BIOPSY  ENDOSCOPIC POLYPECTOMY    IMAGING  No results found. [id number] [facility] COURSE:   # Severe symptomatic Iron def anemia from GI loss due Jayson lesion   - s/p 1u PRBC transfusion   -  iron panel, ferritin, vit b12, folate- consistent with iron def anemia   - continue PPI   - s/p IV Iron X1   - s/p EGD : Finding Stomach: large 10 cm hiatal hernia with associated mild Jayson erosions s/p biopsies, 4 mm sessile polyp in the body s/p cold forceps polypectomy  - s/p Colonoscopy: Finding -Moderate colonic diverticulosis involving the sigmoid and Small internal hemorrhoids. - GI consult ronal input   - hemonc input appt      # Right lower extremity superficial thrombus present in the right greater saphenous thigh and calf vein  - Doppler US showing   - Being a superficial vein, no need for anticoagulation or IVC filter placement. Advised follow up to prevent progressing to deeper veins.          DISCHARGE DIAGNOSES / PLAN:    # Severe symptomatic Iron def anemia from GI loss due Jayson lesion   # Right lower extremity superficial thrombus present in the right greater saphenous thigh and calf vein      Patient Active Problem List   Diagnosis Code    GERD (gastroesophageal reflux disease) K21.9    Ghotra's esophagus K22.70    Dry eyes I93.537    TMJ arthralgia M26.629    Rosacea L71.9    Allergic rhinitis J30.9    Headache(784.0) R51    Insomnia G47.00    Cataract H26.9    Osteoarthritis M19.90    Diverticula of colon K57.30    IBS (irritable bowel syndrome) K58.9    Severe obesity (HCC) E66.01    Severe anemia D64.9               PENDING TEST RESULTS:   At the time of discharge the following test results are still pending: None     FOLLOW UP APPOINTMENTS:    Follow-up Information     Follow up With Specialties Details Why Contact Info    Evgeny Mayorga NP Family Practice Schedule an appointment as soon as possible for a visit in 1 week Post hospital discharge follow up and repeat CBC  [address], [address] Surgery   [address] [place] [address],  Hematology and Oncology, Internal Medicine, Hematology, Oncology Schedule an appointment as soon as possible for a visit in 2 weeks For follow up and IV iron. Hematology  [address] [facility] [address] [facility] [place] [address]      Jessica Petty MD Gastroenterology Schedule an appointment as soon as possible for a visit in 1 week Gasteroenterology for biopsy result  [address]  [phone]             ADDITIONAL CARE RECOMMENDATIONS:   · It is important that you take the medication exactly as they are prescribed. · Keep your medication in the bottles provided by the pharmacist and keep a list of the medication names, dosages, and times to be taken in your wallet. · Do not take other medications without consulting your doctor. · No drinking alcohol or driving car or operating machinery if you are on narcotic pain medications. Donot take sedating mediations if you are sleepy or confused.    · Fall Precautions  · Keep Well Hydrated  · Report to your medical provider if you feel you have  developed allergies to medications  · Follow up with your PCP or Consultant for medication adjustments and refills  · Monitor for signs of fevers,chills,bleeding,chest pain and seek medical attention if you do so. DIET: Regular     ACTIVITY: As tolerated     WOUND CARE: None     EQUIPMENT needed: None       DISCHARGE MEDICATIONS:  Current Discharge Medication List      CONTINUE these medications which have NOT CHANGED    Details   bismuth subsalicylate (PEPTO-BISMOL) 262 mg/15 mL suspension Take 30 mL by mouth every four (4) hours as needed for Indigestion. magnesium hydroxide (NAYAK MILK OF MAGNESIA) 400 mg/5 mL suspension Take 30 mL by mouth daily as needed for Constipation. omeprazole (PRILOSEC) 20 mg capsule Take 1 Cap by mouth daily. Qty: 90 Cap, Refills: 1    Associated Diagnoses: Gastroesophageal reflux disease without esophagitis      white petrolatum (LACRILUBE) ointment Administer  to both eyes nightly. dextran 70-hypromellose (ARTIFICIAL TEARS) ophthalmic solution Administer  to both eyes as needed. ergocalciferol (ERGOCALCIFEROL) 50,000 unit capsule Take 1 Cap by mouth every seven (7) days.   Qty: 12 Cap, Refills: 3         STOP taking these medications       naproxen sodium (ALEVE) 220 mg cap Comments:   Reason for Stopping:               All Micro Results     None          Recent Results (from the past 24 hour(s))   OCCULT BLOOD, STOOL    Collection Time: 09/12/19  1:07 AM   Result Value Ref Range    Occult blood, stool NEGATIVE  NEG     CBC W/O DIFF    Collection Time: 09/12/19  5:17 AM   Result Value Ref Range    WBC 4.8 3.6 - 11.0 K/uL    RBC 4.83 3.80 - 5.20 M/uL    HGB 8.9 (L) 11.5 - 16.0 g/dL    HCT 33.8 (L) 35.0 - 47.0 %    MCV 70.0 (L) 80.0 - 99.0 FL    MCH 18.4 (L) 26.0 - 34.0 PG    MCHC 26.3 (L) 30.0 - 36.5 g/dL    RDW 26.7 (H) 11.5 - 14.5 %    PLATELET 177 246 - 607 K/uL    NRBC 0.0 0  WBC    ABSOLUTE NRBC 0.00 0.00 - 9.20 K/uL   METABOLIC PANEL, BASIC    Collection Time: 09/12/19  5:17 AM   Result Value Ref Range    Sodium 140 136 - 145 mmol/L    Potassium 3.4 (L) 3.5 - 5.1 mmol/L    Chloride 105 97 - 108 mmol/L    CO2 24 21 - 32 mmol/L    Anion gap 11 5 - 15 mmol/L    Glucose 103 (H) 65 - 100 mg/dL    BUN 7 6 - 20 MG/DL    Creatinine 0.54 (L) 0.55 - 1.02 MG/DL    BUN/Creatinine ratio 13 12 - 20      GFR est AA >60 >60 ml/min/1.73m2    GFR est non-AA >60 >60 ml/min/1.73m2    Calcium 9.2 8.5 - 10.1 MG/DL           NOTIFY YOUR PHYSICIAN FOR ANY OF THE FOLLOWING:   Fever over 101 degrees for 24 hours. Chest pain, shortness of breath, fever, chills, nausea, vomiting, diarrhea, change in mentation, falling, weakness, bleeding. Severe pain or pain not relieved by medications. Or, any other signs or symptoms that you may have questions about. DISPOSITION:  x  Home With:   OT  PT  HH  RN       Long term SNF/Inpatient Rehab    Independent/assisted living    Hospice    Other:       PATIENT CONDITION AT DISCHARGE:     Functional status    Poor     Deconditioned    x Independent      Cognition     Lucid     Forgetful     Dementia      Catheters/lines (plus indication)    Pace     PICC     PEG    x None      Code status    x Full code     DNR      PHYSICAL EXAMINATION AT DISCHARGE:  Gen:  No distress, alert  HEENT:  Normal cephalic atraumatic, extra-occular movements are intact. Neck:  Supple, No JVD  Lungs:  Clear bilaterally, no wheeze, no rales, normal effort  Heart:  Regular Rate and Rhythm, normal S1 and S2, no edema  Abdomen:  Soft, non tender, normal bowel sounds, no guarding.   Extremities:  right extremity +1 edema   Neurological:  Awake and alert, CN's are intact  Skin:  No rashes or moles  Mental Status:  Normal thought process, does not appear anxious      CHRONIC MEDICAL DIAGNOSES:  Problem List as of 9/12/2019 Date Reviewed: 10/12/2015          Codes Class Noted - Resolved    Severe anemia ICD-10-CM: D64.9  ICD-9-CM: 285.9  9/10/2019 - Present        Severe obesity [facility]) ICD-10-CM: E66.01  ICD-9-CM: 278.01  9/6/2019 - Present        IBS (irritable bowel syndrome) ICD-10-CM: K58.9  ICD-9-CM: 564.1  5/2/2011 - Present        Diverticula of colon ICD-10-CM: K57.30  ICD-9-CM: 562.10  3/25/2011 - Present        GERD (gastroesophageal reflux disease) ICD-10-CM: K21.9  ICD-9-CM: 530.81  3/18/2011 - Present        Ghotra's esophagus ICD-10-CM: K22.70  ICD-9-CM: 530.85  3/18/2011 - Present    Overview Signed 3/18/2011 10:03 AM by Gerhard LUCAS     Improved on EGD             Dry eyes ICD-10-CM: C01.845  ICD-9-CM: 375.15  3/18/2011 - Present        TMJ arthralgia ICD-10-CM: J04.466  ICD-9-CM: 524.62  3/18/2011 - Present        Rosacea ICD-10-CM: L71.9  ICD-9-CM: 695.3  3/18/2011 - Present        Allergic rhinitis ICD-10-CM: J30.9  ICD-9-CM: 477.9  3/18/2011 - Present        Headache(784.0) ICD-10-CM: R51  ICD-9-CM: 784.0  3/18/2011 - Present    Overview Signed 3/18/2011 10:07 AM by Gerhard LUCAS     Migraine, hx of cluster HAs             Insomnia ICD-10-CM: G47.00  ICD-9-CM: 780.52  3/18/2011 - Present        Cataract ICD-10-CM: H26.9  ICD-9-CM: 366.9  3/18/2011 - Present    Overview Signed 3/18/2011 10:10 AM by Stormy Wilson     Right               Osteoarthritis ICD-10-CM: M19.90  ICD-9-CM: 715.90  3/18/2011 - Present    Overview Addendum 8/2/2013  9:29 AM by Gerhard LUCAS     Knees, hips             RESOLVED: Anemia due to chronic blood loss ICD-10-CM: D50.0  ICD-9-CM: 280.0  3/19/2011 - 8/2/2013        RESOLVED: GI bleed ICD-10-CM: O62.0  ICD-9-CM: 578.9  3/19/2011 - 8/2/2013                Discussed with patient and family. Explained the importance of following up, Compliance with medications and recommendations on discharge,Side effect profile of medications were explained. Safety precautions at home and while taking pain medications also explained. All questions answered to the satisfaction of the patient/family.  Discussed with consultant(s) who are agreeable to the discharge. Verbal and written instructions on discharge given. Explained about Discharge medications and side effect profile. Advised patient/family to followup with their pcp for medication refills and preauthorization of medications, Home health orders. checkups,screenign programs as appropriate for age. Thank you Antione Nevarez NP for taking care of your patient, Please call with any questions.       Greater than 35 minutes were spent with the patient on counseling and coordination of care    Signed:   Deya Daniels MD  9/12/2019  3:33 PM

## 2019-09-12 NOTE — ROUTINE PROCESS
Derrell Nargis  1940  [id number]    Situation:  Verbal report received from: Tim Quezadaist  Procedure: Procedure(s):  ESOPHAGOGASTRODUODENOSCOPY (EGD)  COLONOSCOPY  ESOPHAGOGASTRODUODENAL (EGD) BIOPSY  ENDOSCOPIC POLYPECTOMY    Background:    Preoperative diagnosis: Anemia  Postoperative diagnosis: Hiatal Hernia  GAstric Polyp  Jayson Erosions  Diverticulosis  Internal Hemorrhoids    :  Dr. Candis Otero  Assistant(s): Endoscopy Technician-1: Sharath Nix  Endoscopy RN-1: Alana Krishna RN    Specimens:   ID Type Source Tests Collected by Time Destination   1 : Duodenum Rule Out Celiac Disease Preservative   Odalis Khalil MD 9/12/2019 1116 Pathology   2 : Gastric Rule Out H Pylori Preservative   Odalis Khalil MD 9/12/2019 1124 Pathology   3 : Gastric Polyp Preservative   Odalis Khalil MD 9/12/2019 1124 Pathology     H. Pylori  no    Assessment:  Intra-procedure medications   Anesthesia gave intra-procedure sedation and medications, see anesthesia flow sheet yes    Intravenous fluids: NS@ KVO     Vital signs stable     Abdominal assessment: round and soft    Recommendation:  Return to floor.

## 2019-09-12 NOTE — PROGRESS NOTES
Heme/ONC  Got call from IR  Pt has superficial vein thrombosis and does not need IVC filter  Agree with this  Can monitor SVT with ultrasound to make sure no DVT

## 2019-09-12 NOTE — PROGRESS NOTES
Bedside and Verbal shift change report given to Loida RN (oncoming nurse) by Blessing Butler RN (offgoing nurse). Report included the following information SBAR, Kardex, Intake/Output, MAR, Accordion, Recent Results and Med Rec Status. 0045- Obtained stool sample for occult stool.

## 2019-09-12 NOTE — ANESTHESIA POSTPROCEDURE EVALUATION
Post-Anesthesia Evaluation and Assessment    Patient: Therese Scott MRN: [id number]  SSN: [id number]    YOB: 1940  Age: 70 y.o. Sex: female      I have evaluated the patient and they are stable and ready for discharge from the PACU. Cardiovascular Function/Vital Signs  Visit Vitals  /67   Pulse 76   Temp 36.5 °C (97.7 °F)   Resp 22   Ht 5' 3\" (1.6 m)   Wt 96.6 kg (213 lb)   SpO2 100%   BMI 37.73 kg/m²       Patient is status post MAC anesthesia for Procedure(s):  ESOPHAGOGASTRODUODENOSCOPY (EGD)  COLONOSCOPY  ESOPHAGOGASTRODUODENAL (EGD) BIOPSY  ENDOSCOPIC POLYPECTOMY. Nausea/Vomiting: None    Postoperative hydration reviewed and adequate. Pain:  Pain Scale 1: Numeric (0 - 10) (09/12/19 1232)  Pain Intensity 1: 10 (09/12/19 1232)   Managed    Neurological Status: At baseline    Mental Status, Level of Consciousness: Alert and  oriented to person, place, and time    Pulmonary Status:   O2 Device: Room air (09/12/19 1222)   Adequate oxygenation and airway patent    Complications related to anesthesia: None    Post-anesthesia assessment completed. No concerns    Signed By: Herlinda Cee MD     September 12, 2019              Procedure(s):  ESOPHAGOGASTRODUODENOSCOPY (EGD)  COLONOSCOPY  ESOPHAGOGASTRODUODENAL (EGD) BIOPSY  ENDOSCOPIC POLYPECTOMY. MAC    <BSHSIANPOST>    Vitals Value Taken Time   /62 9/12/2019 12:37 PM   Temp 36.5 °C (97.7 °F) 9/12/2019 11:56 AM   Pulse 68 9/12/2019 12:37 PM   Resp 20 9/12/2019 12:37 PM   SpO2 95 % 9/12/2019 12:37 PM   Vitals shown include unvalidated device data.

## 2019-09-12 NOTE — PROGRESS NOTES
Problem: Falls - Risk of  Goal: *Absence of Falls  Description  Document Vasile Dustin Fall Risk and appropriate interventions in the flowsheet. Outcome: Progressing Towards Goal  Note:   Fall Risk Interventions:  Mobility Interventions: Patient to call before getting OOB         Medication Interventions: Patient to call before getting OOB    Elimination Interventions: Call light in reach    History of Falls Interventions: Room close to nurse's station         Problem: Patient Education: Go to Patient Education Activity  Goal: Patient/Family Education  Outcome: Progressing Towards Goal     Problem: Pain  Goal: *Control of Pain  Outcome: Progressing Towards Goal     Problem: Patient Education: Go to Patient Education Activity  Goal: Patient/Family Education  Outcome: Progressing Towards Goal     Problem: Pressure Injury - Risk of  Goal: *Prevention of pressure injury  Description  Document Skyler Scale and appropriate interventions in the flowsheet.   Outcome: Progressing Towards Goal  Note:   Pressure Injury Interventions:  Sensory Interventions: Assess changes in LOC, Keep linens dry and wrinkle-free         Activity Interventions: Increase time out of bed    Mobility Interventions: Pressure redistribution bed/mattress (bed type)    Nutrition Interventions: Document food/fluid/supplement intake                     Problem: Patient Education: Go to Patient Education Activity  Goal: Patient/Family Education  Outcome: Progressing Towards Goal     Problem: Discharge Planning  Goal: *Discharge to safe environment  Outcome: Progressing Towards Goal  Goal: *Knowledge of medication management  Outcome: Progressing Towards Goal  Goal: *Knowledge of discharge instructions  Outcome: Progressing Towards Goal     Problem: Patient Education: Go to Patient Education Activity  Goal: Patient/Family Education  Outcome: Progressing Towards Goal

## 2019-09-12 NOTE — PROGRESS NOTES

## 2019-09-12 NOTE — ANESTHESIA PREPROCEDURE EVALUATION
Relevant Problems   No relevant active problems       Anesthetic History   No history of anesthetic complications            Review of Systems / Medical History  Patient summary reviewed, nursing notes reviewed and pertinent labs reviewed    Pulmonary  Within defined limits                 Neuro/Psych   Within defined limits           Cardiovascular  Within defined limits                Exercise tolerance: >4 METS     GI/Hepatic/Renal     GERD: well controlled           Endo/Other        Morbid obesity     Other Findings              Physical Exam    Airway  Mallampati: II  TM Distance: > 6 cm  Neck ROM: normal range of motion   Mouth opening: Normal     Cardiovascular  Regular rate and rhythm,  S1 and S2 normal,  no murmur, click, rub, or gallop             Dental  No notable dental hx       Pulmonary  Breath sounds clear to auscultation               Abdominal  GI exam deferred       Other Findings            Anesthetic Plan    ASA: 3  Anesthesia type: MAC            Anesthetic plan and risks discussed with: Patient

## 2019-09-12 NOTE — DISCHARGE INSTRUCTIONS
Dear Nayeli Terrell,    Thank you for choosing [facility] for your healthcare needs. We strive to provide EXCELLENT care to you and your family. In an effort to explain clearly why you were here in the hospital, I've also written a very brief summary below. Other details including formal diagnosis, medication changes, and follow up appointment recommendations can also be found in this packet. You were admitted for Severe symptomatic Iron deficiency anemia due to Ronnald Puls erosions and you have EGD which showed doug erosions . You also received care from specialist physicians in the following specialties:  [address] [place] [address]    Here are the updates to your medication list:  Please see medication list     I have reviewed discharge instructions with the patient. The patient verbalized understanding. [employer] Activation    Thank you for requesting access to [employer]. Please follow the instructions below to securely access and download your online medical record. [employer] allows you to send messages to your doctor, view your test results, renew your prescriptions, schedule appointments, and more. How Do I Sign Up? 1. In your internet browser, go to [email or web address]  2. Click on the First Time User? Click Here link in the Sign In box. You will be redirect to the New Member Sign Up page. 3. Enter your [employer] Access Code exactly as it appears below. You will not need to use this code after youve completed the sign-up process. If you do not sign up before the expiration date, you must request a new code. [employer] Access Code: [email or web address]  Expires: 10/21/2019  1:55 PM (This is the date your [employer] access code will )    4. Enter the last four digits of your Social Security Number (xxxx) and Date of Birth (mm/dd/yyyy) as indicated and click Submit. You will be taken to the next sign-up page. 5. Create a [employer] ID.  This will be your [employer] login ID and cannot be changed, so think of one that is secure and easy to remember. 6. Create a [employer] password. You can change your password at any time. 7. Enter your Password Reset Question and Answer. This can be used at a later time if you forget your password. 8. Enter your e-mail address. You will receive e-mail notification when new information is available in [address] 9. Click Sign Up. You can now view and download portions of your medical record. 10. Click the Download Summary menu link to download a portable copy of your medical information. Additional Information    If you have questions, please visit the Frequently Asked Questions section of the [employer] website at [email or web address] Remember, [employer] is NOT to be used for urgent needs. For medical emergencies, dial 911. Remember that it is important for you to take your medications exactly as they are prescribed. It is helpful to keep a list of your medication with the names, dosages, and times to be taken in your wallet. Additionally,   - Avoid taking over the counter NSAID use     Make sure to also see your primary care doctor for follow-up. Bring these papers with you and be sure to review your medication list with your doctor. I cannot stress the importance of follow up enough. I've included the information for your follow-up appointments below:     Follow-up Information     Follow up With Specialties Details Why Contact Info    Ursula Sotelo NP Family Practice Schedule an appointment as soon as possible for a visit in 1 week Post hospital discharge follow up and repeat [address] Surgery   [address] [facility] [id number]  [address] [place] [address], [address] [place] [address],  Hematology and Oncology, Internal Medicine, Hematology, Oncology Schedule an appointment as soon as possible for a visit in 2 weeks For follow up and IV iron. Hematology  [address] [place] [address] [place] [address]      Amador Trivedi MD Gastroenterology Schedule an appointment as soon as possible for a visit in 1 week Gasteroenterology for biopsy result  [address]  [phone]            At this time, the following test results are still pending:  Biopsy   Again, please follow-up these results with your primary care provider. Should you have any fever over 101 degrees for 24 hours, chest pain, shortness of breath, fever, chills, nausea, vomiting, diarrhea, change in mentation, falling, weakness, bleeding, or worsening pain, please seek medical attention immediately. Finally, as your discharging physician, you may be receiving a survey regarding my care. I would greatly value and appreciate your input in the survey as we strive for excellence. If you have any questions, I can be reached at [phone]. Thank you so much again for allowing me to care for you at [facility].    Respectfully yours,  Dereje Johnson MD       Patient Education        Iron Deficiency Anemia: Care Instructions  Your Care Instructions    Anemia means that you do not have enough red blood cells. Red blood cells carry oxygen around your body. When you have anemia, it can make you pale, weak, and tired. Many things can cause anemia. The most common cause is loss of blood. This can happen if you have heavy menstrual periods. It can also happen if you have bleeding in your stomach or bowel. You can also get anemia if you don't have enough iron in your diet or if it's hard for your body to absorb iron. In some cases, pregnancy causes anemia. That's because a pregnant woman needs more iron. Your doctor may do more tests to find the cause of your anemia. If a disease or other health problem is causing it, your doctor will treat that problem.   It's important to follow up with your doctor to make sure that your iron level returns to normal.  Follow-up care is a key part of your treatment and safety. Be sure to make and go to all appointments, and call your doctor if you are having problems. It's also a good idea to know your test results and keep a list of the medicines you take. How can you care for yourself at home? · If your doctor recommended iron pills, take them as directed. ? Try to take the pills on an empty stomach. You can do this about 1 hour before or 2 hours after meals. But you may need to take iron with food to avoid an upset stomach. ? Do not take antacids or drink milk or anything with caffeine within 2 hours of when you take your iron. They can keep your body from absorbing the iron well. ? Vitamin C helps your body absorb iron. You may want to take iron pills with a glass of orange juice or some other food high in vitamin C.  ? Iron pills may cause stomach problems. These include heartburn, nausea, diarrhea, constipation, and cramps. It can help to drink plenty of fluids and include fruits, vegetables, and fiber in your diet. ? It's normal for iron pills to make your stool a greenish or grayish black. But internal bleeding can also cause dark stool. So it's important to tell your doctor about any color changes. ? Call your doctor if you think you are having a problem with your iron pills. Even after you start to feel better, it will take several months for your body to build up its supply of iron. ? If you miss a pill, don't take a double dose. ? Keep iron pills out of the reach of small children. Too much iron can be very dangerous. · Eat foods with a lot of iron. These include red meat, shellfish, poultry, and eggs. They also include beans, raisins, whole-grain bread, and leafy green vegetables. · Steam your vegetables. This is the best way to prepare them if you want to get as much iron as possible. · Be safe with medicines.  Do not take nonsteroidal anti-inflammatory pain relievers unless your doctor tells you to. These include aspirin, naproxen (Aleve), and ibuprofen (Advil, Motrin). · Liquid iron can stain your teeth. But you can mix it with water or juice and drink it with a straw. Then it won't get on your teeth. When should you call for help? Call 911 anytime you think you may need emergency care. For example, call if:    · You passed out (lost consciousness).    Call your doctor now or seek immediate medical care if:    · You are short of breath.     · You are dizzy or light-headed, or you feel like you may faint.     · You have new or worse bleeding.    Watch closely for changes in your health, and be sure to contact your doctor if:    · You feel weaker or more tired than usual.     · You do not get better as expected. Where can you learn more? Go to http://ana maria-jackie.info/. Enter X198 in the search box to learn more about \"Iron Deficiency Anemia: Care Instructions. \"  Current as of: March 28, 2019  Content Version: 12.1  © 6441-4781 Healthwise, Incorporated. Care instructions adapted under license by [employer] (which disclaims liability or warranty for this information). If you have questions about a medical condition or this instruction, always ask your healthcare professional. [address] any warranty or liability for your use of this information.

## 2019-09-12 NOTE — H&P
[address], [address] [place] [address]          Pre-procedure History and Physical       NAME:  Levi Woods   :   1940   MRN:   [id number]     CHIEF COMPLAINT/HPI: See procedure note    PMH:  Past Medical History:   Diagnosis Date    Anemia due to chronic blood loss 3/19/2011    DDD (degenerative disc disease), lumbar     ruptured disc    GI bleed 3/19/2011    Obesity (BMI 30-39. 9)        PSH:  Past Surgical History:   Procedure Laterality Date    DEXA BONE DENSITY STUDY AXIAL  11    nml    HX CATARACT REMOVAL      left, with lens    HX CHOLECYSTECTOMY      HX COLONOSCOPY  3/11    HX HEENT      deviated septum repair    HX ORTHOPAEDIC      right elbow fracture repair    HX ORTHOPAEDIC      right elbow, carpal tunnel    HX TONSILLECTOMY      SOY MAMMOGRAM DIGITAL BILATERAL  12    nml    NEUROLOGICAL PROCEDURE UNLISTED      ulnar release--right elbow    TOTAL HIP ARTHROPLASTY      left       Allergies: Allergies   Allergen Reactions    Contuss Unknown (comments)     Unknown      Sulfa (Sulfonamide Antibiotics) Hives and Swelling       Home Medications:  Prior to Admission Medications   Prescriptions Last Dose Informant Patient Reported? Taking?   bismuth subsalicylate (PEPTO-BISMOL) 262 mg/15 mL suspension 9/3/2019 at Unknown time  Yes Yes   Sig: Take 30 mL by mouth every four (4) hours as needed for Indigestion. dextran 70-hypromellose (ARTIFICIAL TEARS) ophthalmic solution 9/10/2019 at Unknown time  Yes Yes   Sig: Administer  to both eyes as needed. ergocalciferol (ERGOCALCIFEROL) 50,000 unit capsule   No No   Sig: Take 1 Cap by mouth every seven (7) days. magnesium hydroxide (NAYAK MILK OF MAGNESIA) 400 mg/5 mL suspension 9/3/2019 at Unknown time  Yes Yes   Sig: Take 30 mL by mouth daily as needed for Constipation.    naproxen sodium (ALEVE) 220 mg cap 9/3/2019 at Unknown time  Yes Yes   Sig: Take 1 Cap by mouth daily as needed. omeprazole (PRILOSEC) 20 mg capsule 9/3/2019 at Unknown time  No Yes   Sig: Take 1 Cap by mouth daily. white petrolatum (LACRILUBE) ointment 9/9/2019 at Unknown time  Yes Yes   Sig: Administer  to both eyes nightly. Facility-Administered Medications: None       Hospital Medications:  Current Facility-Administered Medications   Medication Dose Route Frequency    0.9% sodium chloride infusion 250 mL  250 mL IntraVENous PRN    sodium chloride (NS) flush 5-40 mL  5-40 mL IntraVENous Q8H    sodium chloride (NS) flush 5-40 mL  5-40 mL IntraVENous PRN    acetaminophen (TYLENOL) tablet 650 mg  650 mg Oral Q4H PRN    sodium chloride (NS) flush 5-40 mL  5-40 mL IntraVENous Q8H    sodium chloride (NS) flush 5-40 mL  5-40 mL IntraVENous PRN    pantoprazole (PROTONIX) 40 mg in sodium chloride 0.9% 10 mL injection  40 mg IntraVENous DAILY AFTER BREAKFAST       Family History:  Family History   Problem Relation Age of Onset    Alcohol abuse Father     COPD Father     Cancer Brother         bladder    Alcohol abuse Sister     Dementia Sister     Heart Disease Brother         PAD    Heart Disease Brother        Social History:  Social History     Tobacco Use    Smoking status: Never Smoker    Smokeless tobacco: Never Used   Substance Use Topics    Alcohol use: No         PHYSICAL EXAM PRIOR TO SEDATION:  General: Alert, in no acute distress    Lungs:            CTA bilaterally  Heart:  Normal S1, S2    Abdomen: Soft, Non distended, Non tender. Normoactive bowel sounds. Assessment:   Stable for sedation administration.   Date of last colonoscopy: 8 yrs, Polyps  No    Plan:   · Endoscopic procedure with sedation     Signed By: Nissa Mishra MD     9/12/2019  11:10 AM

## 2019-09-12 NOTE — PROGRESS NOTES
TRANSFER - OUT REPORT:    Verbal report given to Delilah BROCK(name) on [facility]  being transferred to [facility](unit) for routine post - op       Report consisted of patients Situation, Background, Assessment and   Recommendations(SBAR). Information from the following report(s) SBAR, Kardex, Procedure Summary, Intake/Output, MAR, Recent Results and Cardiac Rhythm SR was reviewed with the receiving nurse. Lines:   Peripheral IV 09/10/19 Left Wrist (Active)   Site Assessment Clean, dry, & intact 9/12/2019  4:52 AM   Phlebitis Assessment 0 9/12/2019  4:52 AM   Infiltration Assessment 0 9/12/2019  4:52 AM   Dressing Status Clean, dry, & intact 9/12/2019  4:52 AM   Dressing Type Tape;Transparent 9/12/2019  4:52 AM   Hub Color/Line Status Capped;Flushed 9/12/2019  4:52 AM   Action Taken Open ports on tubing capped 9/12/2019  4:52 AM   Alcohol Cap Used Yes 9/12/2019  4:52 AM       Peripheral IV 09/10/19 Right Antecubital (Active)   Site Assessment Clean, dry, & intact 9/12/2019  4:52 AM   Phlebitis Assessment 0 9/12/2019  4:52 AM   Infiltration Assessment 0 9/12/2019  4:52 AM   Dressing Status Clean, dry, & intact 9/12/2019  4:52 AM   Dressing Type Tape;Transparent 9/12/2019  4:52 AM   Hub Color/Line Status Capped;Flushed 9/12/2019  4:52 AM   Action Taken Open ports on tubing capped 9/12/2019  4:52 AM   Alcohol Cap Used Yes 9/12/2019  4:52 AM        Opportunity for questions and clarification was provided.

## 2019-09-13 ENCOUNTER — PATIENT OUTREACH (OUTPATIENT)
Dept: FAMILY MEDICINE CLINIC | Age: 79
End: 2019-09-13

## 2019-09-13 ENCOUNTER — TELEPHONE (OUTPATIENT)
Dept: FAMILY MEDICINE CLINIC | Age: 79
End: 2019-09-13

## 2019-09-13 NOTE — TELEPHONE ENCOUNTER
Fabi Thayer, AKI Rivera, LPN   Caller: Unspecified (Today, 10:12 AM)             She should take tylenol no aleve until they find out why she has anemia      Patient informed of note

## 2019-09-13 NOTE — TELEPHONE ENCOUNTER
Patient states she was in hospital for 3 days and now her right buttock, groin area,comes down and  hurts bad, because of laying down she cant walk, have pain continuously, she is asking Geri that Can she take ALEVE, the hospital told her not to.  But she is in lot of pain all the time    Requesting call back   BCB#  [phone]  Upcoming JESSENIA : September 18, 2019 10:00 AM f

## 2019-09-16 NOTE — PROGRESS NOTES
.  Hospital Discharge Follow-Up      Date/Time:  19 9:49 AM    Patient was admitted to [address] on 9/10/19 and discharged on 19 for Severe symptomatic Iron Def. Anemia. The physician discharge summary was available at the time of outreach. Patient was contacted within 1 business days of discharge. Top Challenges reviewed with the provider   [facility] admit 9/10/19-19 for Severe symptomatic Iron Def Anemia from GI loss due to Wale Neve Lesion  · ALEJANDRO CABEZAS appointment with PCP on 19  · GI follow up scheduled for 19  · D/C Naproxen  · Monitor HGB  HGB 8.9    HCT 33.8      No ACP on File     Method of communication with provider :phone, staff message    Inpatient RRAT score: 5  Was this a readmission? no   Patient stated reason for the readmission:     Nurse Navigator (NN) contacted the patient by telephone to perform post hospital discharge assessment. Verified name and  with patient as identifiers. Provided introduction to self, and explanation of the Nurse Navigator role. Reviewed discharge instructions and red flags with patient who verbalized understanding. Patient given an opportunity to ask questions and does not have any further questions or concerns at this time. The patient agrees to contact the PCP office for questions related to their healthcare. NN provided contact information for future reference. Disease Specific:   N/A    Summary of patient's top problems:  1. Anemia-history significant for DDD, anemia, GI bleed, obesity who presents via private vehicle with chief complaint of abnormal lab results. Pt reports that she was seen by her PCP a few days ago and had bloodwork that showed a hemoglobin of 6.5. Pt has hx of iron deficiency anemia. Received l unit of PRBC and IV Iron.     Home Health orders at discharge: [address]:   Date of initial visit:     [address] ordered/company: none  Durable Medical Equipment received:     Barriers to care? Denies barriers    Advance Care Planning:   Does patient have an Advance Directive:  not on file, message to PCP to have patient follow- up with NN for instructions/discussion     Medication(s):   New Medications at Discharge:   Changed Medications at Discharge:   Discontinued Medications at Discharge: Aleve    Medication reconciliation was performed with patient, who verbalizes understanding of administration of home medications. There were no barriers to obtaining medications identified at this time. Referral to Pharm D needed: no     Current Outpatient Medications   Medication Sig    ergocalciferol (ERGOCALCIFEROL) 50,000 unit capsule Take 1 Cap by mouth every seven (7) days.  bismuth subsalicylate (PEPTO-BISMOL) 262 mg/15 mL suspension Take 30 mL by mouth every four (4) hours as needed for Indigestion.  magnesium hydroxide (NAYAK MILK OF MAGNESIA) 400 mg/5 mL suspension Take 30 mL by mouth daily as needed for Constipation.  omeprazole (PRILOSEC) 20 mg capsule Take 1 Cap by mouth daily.  white petrolatum (LACRILUBE) ointment Administer  to both eyes nightly.  dextran 70-hypromellose (ARTIFICIAL TEARS) ophthalmic solution Administer  to both eyes as needed. No current facility-administered medications for this visit. There are no discontinued medications. [facility] follow up appointment(s):   Future Appointments   Date Time Provider Nigel Porras   9/18/2019 10:00 AM AKI Cintron   9/27/2019 10:15 AM Padma Nevarez NP [facility] SCHED      Non-[facility] follow up appointment(s):   [facility]:  offered and patient declined       Goals      Patient/Family verbalizes understanding of self-management of chronic disease.       [facility] admit 9/10/19-9/12/19 for Severe symptomatic Iron Def Anemia from GI loss due to Izola Sorrow Lesion  · Instructed to take all medications exactly as they are prescribed  · Fall and safety precautions reviewed  · Instructed to keep well hydrated  · Admits to understanding how and when to report to medical providers if feel problems have occurred  · Reviewed follow up appointments- patient had scheduled  · Reviewed Red Flags: fever, chills, bleeding, chest pain, and verbalized understanding to seek help if these occur. · NN will follow in 7-10 days.  pk

## 2019-09-18 ENCOUNTER — OFFICE VISIT (OUTPATIENT)
Dept: FAMILY MEDICINE CLINIC | Age: 79
End: 2019-09-18

## 2019-09-18 VITALS
HEIGHT: 63 IN | HEART RATE: 93 BPM | TEMPERATURE: 98.6 F | BODY MASS INDEX: 36.89 KG/M2 | RESPIRATION RATE: 18 BRPM | SYSTOLIC BLOOD PRESSURE: 129 MMHG | WEIGHT: 208.2 LBS | OXYGEN SATURATION: 97 % | DIASTOLIC BLOOD PRESSURE: 76 MMHG

## 2019-09-18 DIAGNOSIS — E66.01 SEVERE OBESITY (HCC): ICD-10-CM

## 2019-09-18 DIAGNOSIS — M16.0 PRIMARY OSTEOARTHRITIS OF BOTH HIPS: ICD-10-CM

## 2019-09-18 DIAGNOSIS — D50.8 OTHER IRON DEFICIENCY ANEMIA: ICD-10-CM

## 2019-09-18 NOTE — PROGRESS NOTES
Chief Complaint   Patient presents with   [facility] Follow Up     [facility] 9/10/2019-9/12/2019 DX: Anemia      1. Have you been to the ER, urgent care clinic since your last visit? Hospitalized since your last visit? Yes Where: [facility]    2. Have you seen or consulted any other health care providers outside of the [address] [place] [address] since your last visit? Include any pap smears or colon screening.  No

## 2019-09-18 NOTE — PROGRESS NOTES
HISTORY OF PRESENT ILLNESS  Noah Schwartz is a 70 y.o. female. HPI: Following up form [facility] for severe symptomatic iron deficiency  Anemia. Patient was sent to on ER by me on  9/1019 for hemoglobin of 6.5. She was admitted and was found that she had been losing blood from doug lesion . Her Naproxen was discontinued. She received 2 units of blood, current hemoglobin is 8.9. She was discharged on 9/12/19She has follow up with GI on 9/26/19. Today she reports that she is feeling better. Her diet is not very good due to not able to go to store for grocery shopping. She eats  some frozen food and cheese or peanut butter sandwiches. Now she has some one to shop for her she will try to eat eggs for breakfast, and meat and vegetables for lunch dinner . She will also take iron supplements. Will follow up in three months to recheck her cbc  She has arthritis in both hil and it is very hard for her to ambulate   Past Medical History:   Diagnosis Date    Anemia due to chronic blood loss 3/19/2011    DDD (degenerative disc disease), lumbar     ruptured disc    GI bleed 3/19/2011    Obesity (BMI 30-39. 9)      Past Surgical History:   Procedure Laterality Date    COLONOSCOPY N/A 9/12/2019    COLONOSCOPY performed by Sailaja Good MD at [address] [place] [address]  4/12/11    nml    HX CATARACT REMOVAL  2008    left, with lens    HX CHOLECYSTECTOMY      HX COLONOSCOPY  3/11    HX HEENT      deviated septum repair    HX ORTHOPAEDIC  1984    right elbow fracture repair    HX ORTHOPAEDIC  2011    right elbow, carpal tunnel    HX TONSILLECTOMY      SOY MAMMOGRAM DIGITAL BILATERAL  4/5/12    nml    NEUROLOGICAL PROCEDURE UNLISTED  2009    ulnar release--right elbow    TOTAL HIP ARTHROPLASTY  1998    left     Allergies   Allergen Reactions    Contuss Unknown (comments)     Unknown      Sulfa (Sulfonamide Antibiotics) Hives and Swelling     Current Outpatient Medications:     ferrous sulfate (SLOW FE) 142 mg (45 mg iron) ER tablet, Take 1 tab daily, Disp: 30 Tab, Rfl: 2    ergocalciferol (ERGOCALCIFEROL) 50,000 unit capsule, Take 1 Cap by mouth every seven (7) days. , Disp: 12 Cap, Rfl: 3    omeprazole (PRILOSEC) 20 mg capsule, Take 1 Cap by mouth daily. , Disp: 90 Cap, Rfl: 1    white petrolatum (LACRILUBE) ointment, Administer  to both eyes nightly., Disp: , Rfl:     dextran 70-hypromellose (ARTIFICIAL TEARS) ophthalmic solution, Administer  to both eyes as needed. , Disp: , Rfl:     bismuth subsalicylate (PEPTO-BISMOL) 262 mg/15 mL suspension, Take 30 mL by mouth every four (4) hours as needed for Indigestion. , Disp: , Rfl:     magnesium hydroxide (NAYAK MILK OF MAGNESIA) 400 mg/5 mL suspension, Take 30 mL by mouth daily as needed for Constipation. , Disp: , Rfl:   Review of Systems   Constitutional: Negative. Respiratory: Negative. Cardiovascular: Negative. Gastrointestinal: Negative. Musculoskeletal: Positive for joint pain. Blood pressure 129/76, pulse 93, temperature 98.6 °F (37 °C), temperature source Oral, resp. rate 18, height 5' 3\" (1.6 m), weight 208 lb 3.2 oz (94.4 kg), SpO2 97 %. Body mass index is 36.88 kg/m². Physical Exam   Constitutional: No distress. HENT:   Mouth/Throat: Oropharynx is clear and moist.   Neck: Normal range of motion. Neck supple. Cardiovascular: Normal rate and regular rhythm. No murmur heard. Pulmonary/Chest: Effort normal and breath sounds normal.   Abdominal: Soft. Bowel sounds are normal.   Musculoskeletal: She exhibits tenderness. She exhibits no deformity. Bi;aterl hi arthritis,   Walking with cane   Nursing note and vitals reviewed. ASSESSMENT and PLAN  Diagnoses and all orders for this visit:    1. Transition of care performed with sharing of clinical summary    2. Other iron deficiency anemia  -     ferrous sulfate (SLOW FE) 142 mg (45 mg iron) ER tablet; Take 1 tab daily    3.  Primary osteoarthritis of both hips    4.  Severe obesity ([address]  Follow up in three months  Pt was given an after visit summary which includes diagnosis, current medicines and vital and voiced understanding of treatment plan

## 2019-09-23 ENCOUNTER — TELEPHONE (OUTPATIENT)
Dept: FAMILY MEDICINE CLINIC | Age: 79
End: 2019-09-23

## 2019-09-23 NOTE — TELEPHONE ENCOUNTER
----- Message from Daneen Paget sent at 9/23/2019  4:29 PM EDT -----  Regarding: John/Telephone   Patient return call    Caller's first and last name and relationship (if not the patient):Pt       Best contact number(s):[phone]      Whose call is being returned:Nurse        Details to clarify the request:Pt returned call to the office (unsure of reason).       Ayala Sharif

## 2019-09-27 ENCOUNTER — OFFICE VISIT (OUTPATIENT)
Dept: FAMILY MEDICINE CLINIC | Age: 79
End: 2019-09-27

## 2019-09-27 DIAGNOSIS — Z23 ENCOUNTER FOR IMMUNIZATION: Primary | ICD-10-CM

## 2019-10-07 ENCOUNTER — PATIENT OUTREACH (OUTPATIENT)
Dept: FAMILY MEDICINE CLINIC | Age: 79
End: 2019-10-07

## 2019-10-07 NOTE — PROGRESS NOTES
JESSENIA Follow-up assessment: [facility] admit 9/10/19-9/12/19 for Severe symptomatic Iron Def Anemia from GI loss due to [facility] Lesion  Outbound call made to patient today to complete JESSENIA follow up assessment. Patient verified by 3 identifiers. Follow-up appointments reviewed, and medication reconciliation completed. NN contact information given for questions and concerns. Current Outpatient Medications:     ferrous sulfate (SLOW FE) 142 mg (45 mg iron) ER tablet, Take 1 tab daily, Disp: 30 Tab, Rfl: 2    ergocalciferol (ERGOCALCIFEROL) 50,000 unit capsule, Take 1 Cap by mouth every seven (7) days. , Disp: 12 Cap, Rfl: 3    bismuth subsalicylate (PEPTO-BISMOL) 262 mg/15 mL suspension, Take 30 mL by mouth every four (4) hours as needed for Indigestion. , Disp: , Rfl:     magnesium hydroxide (NAYAK MILK OF MAGNESIA) 400 mg/5 mL suspension, Take 30 mL by mouth daily as needed for Constipation. , Disp: , Rfl:     omeprazole (PRILOSEC) 20 mg capsule, Take 1 Cap by mouth daily. , Disp: 90 Cap, Rfl: 1    white petrolatum (LACRILUBE) ointment, Administer  to both eyes nightly., Disp: , Rfl:     dextran 70-hypromellose (ARTIFICIAL TEARS) ophthalmic solution, Administer  to both eyes as needed. , Disp: , Rfl:      . Goals Addressed                 This Visit's Progress     Patient/Family verbalizes understanding of self-management of chronic disease. [facility] admit 9/10/19-9/12/19 for Severe symptomatic Iron Def Anemia from GI loss due to [facility] Lesion  · Instructed to take all medications exactly as they are prescribed  · Fall and safety precautions reviewed  · Instructed to keep well hydrated  · Admits to understanding how and when to report to medical providers if feel problems have occurred  · Reviewed follow up appointments- patient had scheduled  · Reviewed Red Flags: fever, chills, bleeding, chest pain, and verbalized understanding to seek help if these occur. · NN will follow in 7-10 days.  pk    10/07/19  · [facility] admit 9/10/19-9/12/19 for Severe symptomatic Iron Def Anemia from GI loss due to [facility] Lesion  · Completed PCP follow up on 9/18/19- admits to feeling better  · Flu vaccine on 9/27/19  · Patint admits to taking FeSo4 with improvement in HGB. Currently 8.9 and trending up  · Has Oncology follow up scheduled for 10/9/19  · NN will follow in 7-10 days. pk          Case management Plan:  NN will continue to attempt contacts with patient by telephone or during office visit within next 7-10 days.  Will continue to follow as necessary for the remaining 30 days post visit and will reassess for needs before discharge

## 2019-10-09 ENCOUNTER — OFFICE VISIT (OUTPATIENT)
Dept: ONCOLOGY | Age: 79
End: 2019-10-09

## 2019-10-09 VITALS
SYSTOLIC BLOOD PRESSURE: 113 MMHG | WEIGHT: 203.2 LBS | HEART RATE: 90 BPM | DIASTOLIC BLOOD PRESSURE: 74 MMHG | OXYGEN SATURATION: 93 % | TEMPERATURE: 97.9 F | HEIGHT: 63 IN | BODY MASS INDEX: 36 KG/M2

## 2019-10-09 DIAGNOSIS — I82.890 SUPERFICIAL VEIN THROMBOSIS: ICD-10-CM

## 2019-10-09 DIAGNOSIS — D50.0 IRON DEFICIENCY ANEMIA DUE TO CHRONIC BLOOD LOSS: Primary | ICD-10-CM

## 2019-10-09 NOTE — PATIENT INSTRUCTIONS
Results for Luis Purcell (MRN [id number]) as of 10/9/2019 12:54   Ref. Range 10/12/2015 12:32 9/6/2019 14:52 9/10/2019 11:25 9/11/2019 05:54 9/12/2019 05:17   WBC Latest Ref Range: 3.6 - 11.0 K/uL 5.6 8.4 5.8 4.9 4.8   NRBC Latest Ref Range: 0  WBC   0.0 0.0 0.0   RBC Latest Ref Range: 3.80 - 5.20 M/uL 4.85 4.23 3.89 4.27 4.83   HGB Latest Ref Range: 11.5 - 16.0 g/dL 9.1 (L) 6.5 (LL) 6.0 (L) 7.8 (L) 8.9 (L)   HCT Latest Ref Range: 35.0 - 47.0 % 31.6 (L) 26.8 (L) 25.1 (L) 29.4 (L) 33.8 (L)   MCV Latest Ref Range: 80.0 - 99.0 FL 65 (L) 63 (L) 64.5 (L) 68.9 (L) 70.0 (L)   MCH Latest Ref Range: 26.0 - 34.0 PG 18.8 (L) 15.4 (L) 15.4 (L) 18.3 (L) 18.4 (L)   MCHC Latest Ref Range: 30.0 - 36.5 g/dL 28.8 (L) 24.3 (LL) 23.9 (L) 26.5 (L) 26.3 (L)   RDW Latest Ref Range: 11.5 - 14.5 % 18.0 (H) 19.2 (H) 20.0 (H) 25.9 (H) 26.7 (H)   PLATELET Latest Ref Range: 150 - 400 K/uL 266 263 188 166 173   NEUTROPHILS Latest Ref Range: 32 - 75 % 55  65 66    LYMPHOCYTES Latest Ref Range: 12 - 49 %   20 23    Lymphocytes Latest Units: % 26       MONOCYTES Latest Ref Range: 5 - 13 % 14  9 9    EOSINOPHILS Latest Ref Range: 0 - 7 % 4  3 2    BASOPHILS Latest Ref Range: 0 - 1 % 1  3 (H) 0    IMMATURE GRANULOCYTES Latest Units: % 0  0 0    DF Latest Units:     SMEAR SCANNED MANUAL    ABSOLUTE NRBC Latest Ref Range: 0.00 - 0.01 K/uL   0.00 0.00 0.00   ABS. NEUTROPHILS Latest Ref Range: 1.8 - 8.0 K/UL 3.1  3.7 3.3    ABS. IMM. GRANS. Latest Units: K/UL 0.0  0.0 0.0    ABS. LYMPHOCYTES Latest Ref Range: 0.8 - 3.5 K/UL   1.2 1.1    Abs Lymphocytes Latest Ref Range: 0.7 - 3.1 x10E3/uL 1.5       ABS. MONOCYTES Latest Ref Range: 0.0 - 1.0 K/UL 0.8  0.5 0.4    ABS. EOSINOPHILS Latest Ref Range: 0.0 - 0.4 K/UL 0.2  0.2 0.1    ABS.  BASOPHILS Latest Ref Range: 0.0 - 0.1 K/UL 0.1  0.2 (H) 0.0

## 2019-10-09 NOTE — PROGRESS NOTES
Di Garcia is a 70 y.o. female  Chief Complaint   Patient presents with    Follow-up     Anemia     1. Have you been to the ER, urgent care clinic since your last visit? Hospitalized since your last visit? Yes was at the ER two weeks ago and had blood transfusion. 2. Have you seen or consulted any other health care providers outside of the [address] [place] [address] since your last visit? Include any pap smears or colon screening. Yes, went to a few doctors. Pt has a paper with her that states where she has gone. Pt states she has a lot of pain everywhere, more with her legs. 10 out of 5.

## 2019-10-09 NOTE — PROGRESS NOTES
[facility]  [address], [address] [place]: [phone]  F: [phone]    Reason for Visit:   Karina Wagner is a 70 y.o. female who is seen in consultation at the request of Dr. Katey Chinchilla for evaluation of anemia. Treatment History:   None from us  Prior oral iron but not able to tolerate    History of Present Illness:     Pt seen today for office f/u of anemia. Seen in hospital for iron def anemia. Has seen PCP since hospital visit. Pt is seeing GI. Had path from [facility] hospital negative. Having trouble taking oral iron. Has not had labs since hospital.   Here alone. Walks with a cane. Had superficial vein thrombosis in hospital. Leg swelling is the same. Hospital consult:  hospital consult for iron def anemia. Pt reports a history of iron def anemia in past with GI eval done and bleeding source not clear. Pt states she has been tired and fatigued lately and knew her iron level was down again. Pt states she does not like going to doctors so delay going for eval.   hgb down to 6 on admit. Sat 2% and ferritin 7. Wbc and platelets normal.   Pt also relates a lump on right groin with RLE edema for unclear amount of time. Ultrasound shows thrombus in right greater saphenous thigh vein. Pt states she is for \"filter\". Pt is in bed at my visits. No family present. Denies pain. States not as mobile lately. Past Medical History:   Diagnosis Date    Anemia due to chronic blood loss 3/19/2011    DDD (degenerative disc disease), lumbar     ruptured disc    GI bleed 3/19/2011    Obesity (BMI 30-39. 9)       Past Surgical History:   Procedure Laterality Date    COLONOSCOPY N/A 9/12/2019    COLONOSCOPY performed by Sun Caputo MD at [address] [place] [address]  4/12/11    nml    HX CATARACT REMOVAL  2008    left, with lens    HX CHOLECYSTECTOMY      HX COLONOSCOPY  3/11    HX HEENT      deviated septum repair    HX ORTHOPAEDIC  1984    right elbow fracture repair    HX ORTHOPAEDIC  2011    right elbow, carpal tunnel    HX TONSILLECTOMY      SOY MAMMOGRAM DIGITAL BILATERAL  4/5/12    nml    NEUROLOGICAL PROCEDURE UNLISTED  2009    ulnar release--right elbow    TOTAL HIP ARTHROPLASTY  1998    left      Social History     Tobacco Use    Smoking status: Never Smoker    Smokeless tobacco: Never Used   Substance Use Topics    Alcohol use: No      Family History   Problem Relation Age of Onset    Alcohol abuse Father     COPD Father     Cancer Brother         bladder    Alcohol abuse Sister     Dementia Sister     Heart Disease Brother         PAD    Heart Disease Brother      Current Outpatient Medications   Medication Sig    ferrous sulfate (SLOW FE) 142 mg (45 mg iron) ER tablet Take 1 tab daily    ergocalciferol (ERGOCALCIFEROL) 50,000 unit capsule Take 1 Cap by mouth every seven (7) days.  omeprazole (PRILOSEC) 20 mg capsule Take 1 Cap by mouth daily.  white petrolatum (LACRILUBE) ointment Administer  to both eyes nightly.  bismuth subsalicylate (PEPTO-BISMOL) 262 mg/15 mL suspension Take 30 mL by mouth every four (4) hours as needed for Indigestion.  magnesium hydroxide (NAYAK MILK OF MAGNESIA) 400 mg/5 mL suspension Take 30 mL by mouth daily as needed for Constipation.  dextran 70-hypromellose (ARTIFICIAL TEARS) ophthalmic solution Administer  to both eyes as needed. No current facility-administered medications for this visit. Allergies   Allergen Reactions    Contuss Unknown (comments)     Unknown      Sulfa (Sulfonamide Antibiotics) Hives and Swelling        Review of Systems: A complete review of systems was obtained, negative except as described above.     Physical Exam:     Visit Vitals  /74 (BP 1 Location: Left arm, BP Patient Position: Sitting)   Pulse 90   Temp 97.9 °F (36.6 °C) (Oral)   Ht 5' 3\" (1.6 m)   Wt 203 lb 3.2 oz (92.2 kg)   SpO2 93% BMI 36.00 kg/m²     ECOG PS: 1  General: No distress  Eyes:  anicteric sclerae  HENT: Atraumatic  Neck: Supple  MS: ambulatory   Skin: No rashes, ecchymoses, or petechiae. Normal temperature, turgor, and texture. Psych: alert, conversant  Right groin/ thigh without palpable masses  RLE chronic edema    Results:     Lab Results   Component Value Date/Time    WBC 4.8 09/12/2019 05:17 AM    HGB 8.9 (L) 09/12/2019 05:17 AM    HCT 33.8 (L) 09/12/2019 05:17 AM    PLATELET 033 26/30/9323 05:17 AM    MCV 70.0 (L) 09/12/2019 05:17 AM    ABS. NEUTROPHILS 3.3 09/11/2019 05:54 AM    Hemoglobin (POC) 7.1 (L) 03/19/2011 12:14 PM    Hematocrit (POC) 21 (L) 03/19/2011 12:14 PM     Lab Results   Component Value Date/Time    Sodium 140 09/12/2019 05:17 AM    Potassium 3.4 (L) 09/12/2019 05:17 AM    Chloride 105 09/12/2019 05:17 AM    CO2 24 09/12/2019 05:17 AM    Glucose 103 (H) 09/12/2019 05:17 AM    BUN 7 09/12/2019 05:17 AM    Creatinine 0.54 (L) 09/12/2019 05:17 AM    GFR est AA >60 09/12/2019 05:17 AM    GFR est non-AA >60 09/12/2019 05:17 AM    Calcium 9.2 09/12/2019 05:17 AM    Sodium (POC) 137 03/19/2011 12:14 PM    Potassium (POC) 3.6 03/19/2011 12:14 PM    Chloride (POC) 103 03/19/2011 12:14 PM    Glucose (POC) 167 (H) 03/19/2011 12:14 PM    BUN (POC) 11 03/19/2011 12:14 PM    Creatinine (POC) <0.2 (L) 03/19/2011 12:14 PM    Calcium, ionized (POC) 1.09 (L) 03/19/2011 12:14 PM     Lab Results   Component Value Date/Time    Bilirubin, total 1.2 (H) 09/10/2019 11:25 AM    ALT (SGPT) 14 09/10/2019 11:25 AM    AST (SGOT) 13 (L) 09/10/2019 11:25 AM    Alk. phosphatase 74 09/10/2019 11:25 AM    Protein, total 7.5 09/10/2019 11:25 AM    Albumin 3.3 (L) 09/10/2019 11:25 AM    Globulin 4.2 (H) 09/10/2019 11:25 AM         Records reviewed and summarized above. Pathology report(s) reviewed above. Radiology report(s) reviewed above. Assessment/PLAN:     1)  Recurrent iron deficiency anemia due to GI loss.    Prior hx of iron def with GI w/u in past.   Had blood transfusions in hospital.   Pt is feeling better since hospital.   Having problems taking oral iron. Will re check labs. Can give IV iron if needed. 2) right greater saphenous thigh vein thrombus. Likely due to transient thigh mass pt reports. Incidental finding of prominent right groin LNs. Can re check leg ultrasound since pt is concerned about this . Could also do CT A/P if needed. 3) psychosocial.  Has had a lot of stress. Daughter is dying of Lilibeth Gore disease. F/u here in 3 months  Call if questions  I appreciate the opportunity to participate in Ms. Juwan Vidal's care.     Signed By: Ghulam Motta DO

## 2019-10-21 ENCOUNTER — PATIENT OUTREACH (OUTPATIENT)
Dept: FAMILY MEDICINE CLINIC | Age: 79
End: 2019-10-21

## 2019-10-21 NOTE — PROGRESS NOTES
.  Patient has graduated from the Transitions of Care Coordination  program on 10/21/09. Patient's symptoms are stable at this time. Patient/family has the ability to self-manage. Care management goals have been completed at this time. No further nurse navigator follow up scheduled. Goals Addressed    None         Pt has nurse navigator's contact information for any further questions, concerns, or needs.   Patients upcoming visits:    Future Appointments   Date Time Provider Nigel Porras   1/14/2020 10:30 AM Pablo Cramer  Laura Newton   1/20/2020  2:00 PM Delfin Corona,  [facility] [address]

## 2020-01-14 ENCOUNTER — OFFICE VISIT (OUTPATIENT)
Dept: FAMILY MEDICINE CLINIC | Age: 80
End: 2020-01-14

## 2020-01-14 ENCOUNTER — HOSPITAL ENCOUNTER (OUTPATIENT)
Dept: LAB | Age: 80
Discharge: HOME | End: 2020-01-14
Payer: MEDICARE

## 2020-01-14 VITALS
RESPIRATION RATE: 18 BRPM | DIASTOLIC BLOOD PRESSURE: 88 MMHG | OXYGEN SATURATION: 96 % | BODY MASS INDEX: 36.46 KG/M2 | WEIGHT: 205.8 LBS | HEIGHT: 63 IN | HEART RATE: 100 BPM | TEMPERATURE: 98.1 F | SYSTOLIC BLOOD PRESSURE: 116 MMHG

## 2020-01-14 DIAGNOSIS — D50.8 OTHER IRON DEFICIENCY ANEMIA: Primary | ICD-10-CM

## 2020-01-14 DIAGNOSIS — B37.2 CANDIDA INFECTION OF FLEXURAL SKIN: ICD-10-CM

## 2020-01-14 DIAGNOSIS — R21 RASH: ICD-10-CM

## 2020-01-14 DIAGNOSIS — R35.0 URINARY FREQUENCY: ICD-10-CM

## 2020-01-14 LAB
BILIRUB UR QL STRIP: NEGATIVE
GLUCOSE UR-MCNC: NEGATIVE MG/DL
KETONES P FAST UR STRIP-MCNC: NEGATIVE MG/DL
PH UR STRIP: 5 [PH] (ref 4.6–8)
PROT UR QL STRIP: NEGATIVE
SP GR UR STRIP: 1.02 (ref 1–1.03)
UA UROBILINOGEN AMB POC: NORMAL (ref 0.2–1)
URINALYSIS CLARITY POC: NORMAL
URINALYSIS COLOR POC: NORMAL
URINE BLOOD POC: NEGATIVE
URINE LEUKOCYTES POC: NEGATIVE
URINE NITRITES POC: NEGATIVE

## 2020-01-14 PROCEDURE — 82728 ASSAY OF FERRITIN: CPT

## 2020-01-14 PROCEDURE — 36415 COLL VENOUS BLD VENIPUNCTURE: CPT

## 2020-01-14 PROCEDURE — 85027 COMPLETE CBC AUTOMATED: CPT

## 2020-01-14 PROCEDURE — 83550 IRON BINDING TEST: CPT

## 2020-01-14 RX ORDER — NYSTATIN 100000 U/G
CREAM TOPICAL 2 TIMES DAILY
Qty: 15 G | Refills: 0 | Status: SHIPPED | OUTPATIENT
Start: 2020-01-14 | End: 2020-01-14

## 2020-01-14 RX ORDER — FLUOCINONIDE 0.5 MG/G
CREAM TOPICAL 2 TIMES DAILY
Qty: 30 G | Refills: 0 | Status: SHIPPED | OUTPATIENT
Start: 2020-01-14

## 2020-01-14 RX ORDER — NYSTATIN 100000 [USP'U]/G
POWDER TOPICAL
Qty: 60 G | Refills: 1 | Status: SHIPPED | OUTPATIENT
Start: 2020-01-14

## 2020-01-14 NOTE — PROGRESS NOTES
Chief Complaint   Patient presents with    Anemia     3 mth f/u      1. Have you been to the ER, urgent care clinic since your last visit? Hospitalized since your last visit? No    2. Have you seen or consulted any other health care providers outside of the [address] [place] [address] since your last visit? Include any pap smears or colon screening.  No

## 2020-01-14 NOTE — PROGRESS NOTES
HISTORY OF PRESENT ILLNESS  Sherice Gavin is a 70 y.o. female. HPI: Patient is following up to recheck her cbc, she states that she is eating better, but stopped taking iron pill due to constipation. Currently her granddaughter is living with her until the 21 th,of [place],  she is helping her with grocery shopping. Her grandson will be in town to help her. She also has a care taker come in twice a week. She is complaining of red rash on her face and believes that it is caused by vitamin D,   Reports urinary frequency and has rash with itching on her groin and buttocks. Past Medical History:   Diagnosis Date    Anemia due to chronic blood loss 3/19/2011    DDD (degenerative disc disease), lumbar     ruptured disc    GI bleed 3/19/2011    Obesity (BMI 30-39. 9)      Past Surgical History:   Procedure Laterality Date    COLONOSCOPY N/A 9/12/2019    COLONOSCOPY performed by Jewell Malhotra MD at [address] [place] [address]  4/12/11    nml    HX CATARACT REMOVAL  2008    left, with lens    HX CHOLECYSTECTOMY      HX COLONOSCOPY  3/11    HX HEENT      deviated septum repair    HX ORTHOPAEDIC  1984    right elbow fracture repair    HX ORTHOPAEDIC  2011    right elbow, carpal tunnel    HX TONSILLECTOMY      SOY MAMMOGRAM DIGITAL BILATERAL  4/5/12    nml    NEUROLOGICAL PROCEDURE UNLISTED  2009    ulnar release--right elbow    TOTAL HIP ARTHROPLASTY  1998    left     Allergies   Allergen Reactions    Contuss Unknown (comments)     Unknown      Sulfa (Sulfonamide Antibiotics) Hives and Swelling     Current Outpatient Medications:     fluocinoNIDE (LIDEX) 0.05 % topical cream, Apply  to affected area two (2) times a day., Disp: 30 g, Rfl: 0    nystatin (MYCOSTATIN) powder, Apply to effected area bid, Disp: 60 g, Rfl: 1    ergocalciferol (ERGOCALCIFEROL) 50,000 unit capsule, Take 1 Cap by mouth every seven (7) days. , Disp: 12 Cap, Rfl: 3    magnesium hydroxide (NAYAK MILK OF MAGNESIA) 400 mg/5 mL suspension, Take 30 mL by mouth daily as needed for Constipation. , Disp: , Rfl:     omeprazole (PRILOSEC) 20 mg capsule, Take 1 Cap by mouth daily. , Disp: 90 Cap, Rfl: 1    white petrolatum (LACRILUBE) ointment, Administer  to both eyes nightly., Disp: , Rfl:     dextran 70-hypromellose (ARTIFICIAL TEARS) ophthalmic solution, Administer  to both eyes as needed. , Disp: , Rfl:     ferrous sulfate (SLOW FE) 142 mg (45 mg iron) ER tablet, Take 1 tab daily, Disp: 30 Tab, Rfl: 2    bismuth subsalicylate (PEPTO-BISMOL) 262 mg/15 mL suspension, Take 30 mL by mouth every four (4) hours as needed for Indigestion. , Disp: , Rfl:   Review of Systems   Constitutional: Negative. Respiratory: Negative. Cardiovascular: Negative. Gastrointestinal: Negative. Genitourinary: Positive for frequency. Skin: Positive for rash. Blood pressure 116/88, pulse 100, temperature 98.1 °F (36.7 °C), resp. rate 18, height 5' 3\" (1.6 m), weight 205 lb 12.8 oz (93.4 kg), SpO2 96 %. Body mass index is 36.46 kg/m². Physical Exam  Constitutional:       Appearance: She is obese. Comments: Walking with cane    HENT:      Mouth/Throat:      Mouth: Mucous membranes are moist.      Pharynx: Oropharynx is clear. Neck:      Musculoskeletal: Normal range of motion and neck supple. Cardiovascular:      Rate and Rhythm: Normal rate and regular rhythm. Heart sounds: No murmur. Pulmonary:      Effort: Pulmonary effort is normal.      Breath sounds: Normal breath sounds. Abdominal:      General: Bowel sounds are normal.      Palpations: Abdomen is soft. Genitourinary:     Comments: UA is clear for infection and blood  Skin:     General: Skin is warm and dry. Findings: Erythema present. Comments: Rough, erythematous rash on her face  erythematous rash on her groin and buttocks    Neurological:      Mental Status: She is alert.          ASSESSMENT and PLAN  Diagnoses and all orders for this visit: 1. Other iron deficiency anemia  -     CBC W/O DIFF  -     FERRITIN  -     IRON PROFILE        -     Advised to resume taking iron pills        -    Eats one apple every morning to help with constipation        -    Eat dry figs x or prunes daily         -    Eat salad with meals to hep with constipation    2. Rash  -     fluocinoNIDE (LIDEX) 0.05 % topical cream; Apply  to affected area two (2) times a day. 3. Urinary frequency  -     AMB POC URINALYSIS DIP STICK AUTO W/ MICRO    4. Candida infection of flexural skin  -     nystatin (MYCOSTATIN) powder;  Apply to effected area bid  Pt was given an after visit summary which includes diagnosis, current medicines and vital and voiced understanding of treatment plan

## 2020-01-15 LAB
ERYTHROCYTE [DISTWIDTH] IN BLOOD BY AUTOMATED COUNT: 14.1 % (ref 11.7–15.4)
FERRITIN SERPL-MCNC: 27 NG/ML (ref 15–150)
HCT VFR BLD AUTO: 40.6 % (ref 34–46.6)
HGB BLD-MCNC: 12.9 G/DL (ref 11.1–15.9)
IRON SATN MFR SERPL: 14 % (ref 15–55)
IRON SERPL-MCNC: 64 UG/DL (ref 27–139)
MCH RBC QN AUTO: 25.3 PG (ref 26.6–33)
MCHC RBC AUTO-ENTMCNC: 31.8 G/DL (ref 31.5–35.7)
MCV RBC AUTO: 80 FL (ref 79–97)
PLATELET # BLD AUTO: 242 X10E3/UL (ref 150–450)
RBC # BLD AUTO: 5.09 X10E6/UL (ref 3.77–5.28)
TIBC SERPL-MCNC: 473 UG/DL (ref 250–450)
UIBC SERPL-MCNC: 409 UG/DL (ref 118–369)
WBC # BLD AUTO: 6.5 X10E3/UL (ref 3.4–10.8)

## 2020-01-16 ENCOUNTER — TELEPHONE (OUTPATIENT)
Dept: ONCOLOGY | Age: 80
End: 2020-01-16

## 2020-01-16 DIAGNOSIS — M79.605 LEFT LEG PAIN: ICD-10-CM

## 2020-01-16 DIAGNOSIS — R60.0 LEG EDEMA, RIGHT: ICD-10-CM

## 2020-01-16 DIAGNOSIS — I82.890 SUPERFICIAL VEIN THROMBOSIS: Primary | ICD-10-CM

## 2020-01-16 NOTE — TELEPHONE ENCOUNTER
Called stating she is having trouble with both of her legs and discovered a lump on knee.  Wondering if imaging can be extended to knee      [phone]

## 2020-01-16 NOTE — TELEPHONE ENCOUNTER
Call to patient, HIPAA verified. States the knee that is affected is her left knee and therefore not included in the vascular scan. Would like order changed to include left leg. To Provider.

## 2020-01-17 ENCOUNTER — TELEPHONE (OUTPATIENT)
Dept: ONCOLOGY | Age: 80
End: 2020-01-17

## 2020-01-17 NOTE — TELEPHONE ENCOUNTER
Call to patient, HIPAA verified states \"I am in so much pain, I did not think I could get in the car. \"  Explained to patient that study was to evaluate for DVT and needed to be done sooner rather than later. Stated she had no one to take her to test, encouraged to reschedule ASAP or if pain was that severe to be evaluated in ER. Patient verbalized understanding, accepted  phone number.

## 2020-01-17 NOTE — TELEPHONE ENCOUNTER
Patient called and stated that she is in to much pain and will not be able to go to her ultrasound today.

## 2020-02-03 ENCOUNTER — HOSPITAL ENCOUNTER (OUTPATIENT)
Dept: VASCULAR SURGERY | Age: 80
Discharge: HOME | End: 2020-02-03
Attending: NURSE PRACTITIONER
Payer: MEDICARE

## 2020-02-03 DIAGNOSIS — M79.605 LEFT LEG PAIN: ICD-10-CM

## 2020-02-03 DIAGNOSIS — R60.0 LEG EDEMA, RIGHT: ICD-10-CM

## 2020-02-03 DIAGNOSIS — I82.890 SUPERFICIAL VEIN THROMBOSIS: ICD-10-CM

## 2020-02-03 PROCEDURE — 93970 EXTREMITY STUDY: CPT

## 2020-02-06 NOTE — PROGRESS NOTES
Ultrasound looks ok/ they could not see well  Pt does not have f/u appt with us  Can f/u with us or PCP

## 2020-02-07 ENCOUNTER — TELEPHONE (OUTPATIENT)
Dept: ONCOLOGY | Age: 80
End: 2020-02-07

## 2020-02-10 NOTE — PROGRESS NOTES
DEREJE Verified. Called pt on home phone number listed. Pt was made aware of Ultrasound results. Pt was told may be discussed more at her next visit with us on the 25th of February. Pt verbalized understanding and expressed no further question.   Aashish Chong

## 2020-02-25 ENCOUNTER — OFFICE VISIT (OUTPATIENT)
Dept: ONCOLOGY | Age: 80
End: 2020-02-25

## 2020-02-25 VITALS
HEART RATE: 97 BPM | OXYGEN SATURATION: 97 % | BODY MASS INDEX: 36.75 KG/M2 | DIASTOLIC BLOOD PRESSURE: 81 MMHG | WEIGHT: 207.4 LBS | HEIGHT: 63 IN | SYSTOLIC BLOOD PRESSURE: 121 MMHG | TEMPERATURE: 97.7 F

## 2020-02-25 DIAGNOSIS — D50.0 IRON DEFICIENCY ANEMIA DUE TO CHRONIC BLOOD LOSS: Primary | ICD-10-CM

## 2020-02-25 DIAGNOSIS — G89.29 OTHER CHRONIC PAIN: ICD-10-CM

## 2020-02-25 DIAGNOSIS — I82.890 SUPERFICIAL VEIN THROMBOSIS: ICD-10-CM

## 2020-02-25 DIAGNOSIS — E66.01 SEVERE OBESITY (HCC): ICD-10-CM

## 2020-02-25 NOTE — PROGRESS NOTES
Kg Broussard is a 68 y.o. female  Chief Complaint   Patient presents with    Follow-up    Anemia     1. Have you been to the ER, urgent care clinic since your last visit? Hospitalized since your last visit? No.    2. Have you seen or consulted any other health care providers outside of the [address] [place] [address] since your last visit? Include any pap smears or colon screening. No.    Pt states she has a lot of pain starting from the groin and going into her knee. \"I feel like my knee is going to break any second and getting out of bed feels like I am getting stabbed by a sword. \"  Pt states she cannot take iron because it makes her feel very constipation and pt has no control in her urine.

## 2020-02-25 NOTE — LETTER
2/25/20 Patient: Jessica Mann YOB: 1940 Date of Visit: 2/25/2020 Bri Mehta NP 
[address] [facility] [address] VIA In Basket Dear Bri Mehta NP, Thank you for referring Ms. Ruby Jasso to [address] [place] [address] for evaluation. My notes for this consultation are attached. If you have questions, please do not hesitate to call me. I look forward to following your patient along with you. Sincerely, Dmitry Salter, DO

## 2020-02-25 NOTE — PROGRESS NOTES
[facility] at [address], [address] [place]: [phone]  F: [phone]    Reason for Visit:   Sherice Gavin is a 68 y.o. female who is seen in consultation at the request of Dr. Gianna Evans for evaluation of anemia. Treatment History:   None from us  Prior oral iron but not able to tolerate    History of Present Illness:     Pt seen today for office f/u of anemia and had superficial vein thrombosis. Chronically does not feel well overall. Complaints of chronic total body pain. Hurts all the time. CBC normal 1/20 with PCP. Had follow up ultrasound 2/20. · Limited study due to patient unable to tolerate probe pressure/patient positioning/patient movement. · No evidence of lower extremity vein thrombosis in the visualized vein segments bilaterally; however due to limited visualization of distal femoral bilaterally, left popliteal and bilateral calf veins, isolated vein thrombus cannot be excluded. · Incidental finding of prominent groin lymph nodes bilaterally    Pt states she is physically limited. States has had many falls. Daughter is sick. Pt does not want to do anymore tests. States ultrasound was too hard to do. Hospital consult:  hospital consult for iron def anemia. Pt reports a history of iron def anemia in past with GI eval done and bleeding source not clear. Pt states she has been tired and fatigued lately and knew her iron level was down again. Pt states she does not like going to doctors so delay going for eval.   hgb down to 6 on admit. Sat 2% and ferritin 7. Wbc and platelets normal.   Pt also relates a lump on right groin with RLE edema for unclear amount of time. Ultrasound shows thrombus in right greater saphenous thigh vein. Pt states she is for \"filter\". Pt is in bed at my visits. No family present. Denies pain. States not as mobile lately.        Past Medical History:   Diagnosis Date    Anemia due to chronic blood loss 3/19/2011    DDD (degenerative disc disease), lumbar     ruptured disc    GI bleed 3/19/2011    Obesity (BMI 30-39. 9)       Past Surgical History:   Procedure Laterality Date    COLONOSCOPY N/A 9/12/2019    COLONOSCOPY performed by Iron Burrows MD at [address] [place] [address]  4/12/11    nml    HX CATARACT REMOVAL  2008    left, with lens    HX CHOLECYSTECTOMY      HX COLONOSCOPY  3/11    HX HEENT      deviated septum repair    HX ORTHOPAEDIC  1984    right elbow fracture repair    HX ORTHOPAEDIC  2011    right elbow, carpal tunnel    HX TONSILLECTOMY      SOY MAMMOGRAM DIGITAL BILATERAL  4/5/12    nml    NEUROLOGICAL PROCEDURE UNLISTED  2009    ulnar release--right elbow    TOTAL HIP ARTHROPLASTY  1998    left      Social History     Tobacco Use    Smoking status: Never Smoker    Smokeless tobacco: Never Used   Substance Use Topics    Alcohol use: No      Family History   Problem Relation Age of Onset    Alcohol abuse Father     COPD Father     Cancer Brother         bladder    Alcohol abuse Sister     Dementia Sister     Heart Disease Brother         PAD    Heart Disease Brother      Current Outpatient Medications   Medication Sig    fluocinoNIDE (LIDEX) 0.05 % topical cream Apply  to affected area two (2) times a day.  nystatin (MYCOSTATIN) powder Apply to effected area bid    ferrous sulfate (SLOW FE) 142 mg (45 mg iron) ER tablet Take 1 tab daily    ergocalciferol (ERGOCALCIFEROL) 50,000 unit capsule Take 1 Cap by mouth every seven (7) days.  omeprazole (PRILOSEC) 20 mg capsule Take 1 Cap by mouth daily.  white petrolatum (LACRILUBE) ointment Administer  to both eyes nightly.  bismuth subsalicylate (PEPTO-BISMOL) 262 mg/15 mL suspension Take 30 mL by mouth every four (4) hours as needed for Indigestion.     magnesium hydroxide (NAYAK MILK OF MAGNESIA) 400 mg/5 mL suspension Take 30 mL by mouth daily as needed for Constipation.  dextran 70-hypromellose (ARTIFICIAL TEARS) ophthalmic solution Administer  to both eyes as needed. No current facility-administered medications for this visit. Allergies   Allergen Reactions    Contuss Unknown (comments)     Unknown      Sulfa (Sulfonamide Antibiotics) Hives and Swelling        Review of Systems: A complete review of systems was obtained, negative except as described above. Physical Exam:     Visit Vitals  /81 (BP 1 Location: Right arm, BP Patient Position: Sitting)   Pulse 97   Temp 97.7 °F (36.5 °C) (Oral)   Ht 5' 3\" (1.6 m)   Wt 207 lb 6.4 oz (94.1 kg)   SpO2 97%   BMI 36.74 kg/m²     ECOG PS: 1-2  General: No distress  Eyes:  anicteric sclerae  HENT: Atraumatic  Neck: Supple  MS: ambulatory   Skin: No rashes, ecchymoses, or petechiae. Normal temperature, turgor, and texture. Psych: alert, conversant  Right groin/ thigh without palpable masses  RLE chronic edema    Results:     Lab Results   Component Value Date/Time    WBC 6.5 01/14/2020 11:54 AM    HGB 12.9 01/14/2020 11:54 AM    HCT 40.6 01/14/2020 11:54 AM    PLATELET 850 29/50/9439 11:54 AM    MCV 80 01/14/2020 11:54 AM    ABS.  NEUTROPHILS 3.3 09/11/2019 05:54 AM    Hemoglobin (POC) 7.1 (L) 03/19/2011 12:14 PM    Hematocrit (POC) 21 (L) 03/19/2011 12:14 PM     Lab Results   Component Value Date/Time    Sodium 140 09/12/2019 05:17 AM    Potassium 3.4 (L) 09/12/2019 05:17 AM    Chloride 105 09/12/2019 05:17 AM    CO2 24 09/12/2019 05:17 AM    Glucose 103 (H) 09/12/2019 05:17 AM    BUN 7 09/12/2019 05:17 AM    Creatinine 0.54 (L) 09/12/2019 05:17 AM    GFR est AA >60 09/12/2019 05:17 AM    GFR est non-AA >60 09/12/2019 05:17 AM    Calcium 9.2 09/12/2019 05:17 AM    Sodium (POC) 137 03/19/2011 12:14 PM    Potassium (POC) 3.6 03/19/2011 12:14 PM    Chloride (POC) 103 03/19/2011 12:14 PM    Glucose (POC) 167 (H) 03/19/2011 12:14 PM    BUN (POC) 11 03/19/2011 12:14 PM    Creatinine (POC) <0.2 (L) 03/19/2011 12:14 PM    Calcium, ionized (POC) 1.09 (L) 03/19/2011 12:14 PM     Lab Results   Component Value Date/Time    Bilirubin, total 1.2 (H) 09/10/2019 11:25 AM    ALT (SGPT) 14 09/10/2019 11:25 AM    AST (SGOT) 13 (L) 09/10/2019 11:25 AM    Alk. phosphatase 74 09/10/2019 11:25 AM    Protein, total 7.5 09/10/2019 11:25 AM    Albumin 3.3 (L) 09/10/2019 11:25 AM    Globulin 4.2 (H) 09/10/2019 11:25 AM         Records reviewed and summarized above. Pathology report(s) reviewed above. Radiology report(s) reviewed above. Assessment/PLAN:     1)  Recurrent iron deficiency anemia due to GI loss. Prior hx of iron def with GI w/u in past.   Had blood transfusions in hospital.   Last CBC was normal 1/20. Pt has multiple symptoms today and not due to anemia since not anemic. Can f/u CBC routinely with PCP. Pt can f/u with PCP for this. 2) right greater saphenous thigh vein thrombus. Likely due to transient thigh mass pt reports. Incidental finding of prominent right groin LNs. Re check leg ultrasound negative but not good test due to patient could not tolerate test.   Showed some prominent groin LNs. Discussed could do CT A/P for further eval but pt does not want to do CTs now. May consider later. Will let PCP know. 3) psychosocial.   Has had a lot of stress. Daughter is dying of Gayl Roys disease. F/u here prn. Call if questions  I appreciate the opportunity to participate in Ms. Smooth Vidal's care.     Signed By: Missy Velasco DO

## 2020-07-31 DIAGNOSIS — K21.9 GASTROESOPHAGEAL REFLUX DISEASE WITHOUT ESOPHAGITIS: ICD-10-CM

## 2020-08-04 RX ORDER — OMEPRAZOLE 20 MG/1
CAPSULE, DELAYED RELEASE ORAL
Qty: 90 CAP | Refills: 1 | Status: SHIPPED | OUTPATIENT
Start: 2020-08-04 | End: 2021-02-12

## 2020-11-11 RX ORDER — ERGOCALCIFEROL 1.25 MG/1
CAPSULE ORAL
Qty: 12 CAP | Refills: 3 | Status: SHIPPED | OUTPATIENT
Start: 2020-11-11 | End: 2020-11-19 | Stop reason: SDUPTHER

## 2020-11-19 NOTE — TELEPHONE ENCOUNTER
AKI Nevarez,    Pharmacy stating they did not receive rx sent on 11/11/20. I contacted them to verify. Please resend. ThanksLou    Previous Refill Encounter(s): 11/11/20  12 + 3 (not received by pharmacy)    Requested Prescriptions     Pending Prescriptions Disp Refills    ergocalciferol (ERGOCALCIFEROL) 1,250 mcg (50,000 unit) capsule 12 Cap 3     Sig: Take 1 Cap by mouth every seven (7) days.

## 2020-11-20 RX ORDER — ERGOCALCIFEROL 1.25 MG/1
50000 CAPSULE ORAL
Qty: 12 CAP | Refills: 3 | Status: SHIPPED | OUTPATIENT
Start: 2020-11-20

## 2021-02-12 DIAGNOSIS — K21.9 GASTROESOPHAGEAL REFLUX DISEASE WITHOUT ESOPHAGITIS: ICD-10-CM

## 2021-02-12 RX ORDER — OMEPRAZOLE 20 MG/1
CAPSULE, DELAYED RELEASE ORAL
Qty: 90 CAP | Refills: 0 | Status: SHIPPED | OUTPATIENT
Start: 2021-02-12 | End: 2021-05-15 | Stop reason: SDUPTHER

## 2021-02-16 ENCOUNTER — TELEPHONE (OUTPATIENT)
Dept: FAMILY MEDICINE CLINIC | Age: 81
End: 2021-02-16

## 2021-02-16 NOTE — TELEPHONE ENCOUNTER
Called patient to schedule follow up for. No answer, secured voice mail left. PSR can schedule appointment for patient when she calls back. last office visit was in June.

## 2021-03-31 ENCOUNTER — APPOINTMENT (OUTPATIENT)
Dept: VASCULAR SURGERY | Age: 81
End: 2021-03-31
Attending: EMERGENCY MEDICINE
Payer: MEDICARE

## 2021-03-31 ENCOUNTER — TELEPHONE (OUTPATIENT)
Dept: FAMILY MEDICINE CLINIC | Age: 81
End: 2021-03-31

## 2021-03-31 ENCOUNTER — HOSPITAL ENCOUNTER (EMERGENCY)
Age: 81
Discharge: HOME | End: 2021-03-31
Attending: EMERGENCY MEDICINE
Payer: MEDICARE

## 2021-03-31 VITALS
RESPIRATION RATE: 16 BRPM | TEMPERATURE: 98 F | HEART RATE: 75 BPM | DIASTOLIC BLOOD PRESSURE: 87 MMHG | OXYGEN SATURATION: 96 % | SYSTOLIC BLOOD PRESSURE: 129 MMHG

## 2021-03-31 DIAGNOSIS — M79.89 RIGHT LEG SWELLING: Primary | ICD-10-CM

## 2021-03-31 LAB
ALBUMIN SERPL-MCNC: 3.7 G/DL (ref 3.5–5)
ALBUMIN/GLOB SERPL: 0.9 {RATIO} (ref 1.1–2.2)
ALP SERPL-CCNC: 82 U/L (ref 45–117)
ALT SERPL-CCNC: 15 U/L (ref 12–78)
ANION GAP SERPL CALC-SCNC: 5 MMOL/L (ref 5–15)
APTT PPP: 26.2 SEC (ref 22.1–31)
AST SERPL-CCNC: 13 U/L (ref 15–37)
BASOPHILS # BLD: 0.1 K/UL (ref 0–0.1)
BASOPHILS NFR BLD: 1 % (ref 0–1)
BILIRUB SERPL-MCNC: 1.6 MG/DL (ref 0.2–1)
BUN SERPL-MCNC: 11 MG/DL (ref 6–20)
BUN/CREAT SERPL: 22 (ref 12–20)
CALCIUM SERPL-MCNC: 9.1 MG/DL (ref 8.5–10.1)
CHLORIDE SERPL-SCNC: 105 MMOL/L (ref 97–108)
CO2 SERPL-SCNC: 28 MMOL/L (ref 21–32)
COMMENT, HOLDF: NORMAL
CREAT SERPL-MCNC: 0.49 MG/DL (ref 0.55–1.02)
DIFFERENTIAL METHOD BLD: ABNORMAL
EOSINOPHIL # BLD: 0.2 K/UL (ref 0–0.4)
EOSINOPHIL NFR BLD: 5 % (ref 0–7)
ERYTHROCYTE [DISTWIDTH] IN BLOOD BY AUTOMATED COUNT: 15.7 % (ref 11.5–14.5)
GLOBULIN SER CALC-MCNC: 4.3 G/DL (ref 2–4)
GLUCOSE SERPL-MCNC: 119 MG/DL (ref 65–100)
HCT VFR BLD AUTO: 38.6 % (ref 35–47)
HGB BLD-MCNC: 12.1 G/DL (ref 11.5–16)
IMM GRANULOCYTES # BLD AUTO: 0 K/UL (ref 0–0.04)
IMM GRANULOCYTES NFR BLD AUTO: 0 % (ref 0–0.5)
INR PPP: 1 (ref 0.9–1.1)
LYMPHOCYTES # BLD: 1.1 K/UL (ref 0.8–3.5)
LYMPHOCYTES NFR BLD: 23 % (ref 12–49)
MCH RBC QN AUTO: 25.7 PG (ref 26–34)
MCHC RBC AUTO-ENTMCNC: 31.3 G/DL (ref 30–36.5)
MCV RBC AUTO: 82.1 FL (ref 80–99)
MONOCYTES # BLD: 0.7 K/UL (ref 0–1)
MONOCYTES NFR BLD: 14 % (ref 5–13)
NEUTS SEG # BLD: 2.8 K/UL (ref 1.8–8)
NEUTS SEG NFR BLD: 57 % (ref 32–75)
NRBC # BLD: 0 K/UL (ref 0–0.01)
NRBC BLD-RTO: 0 PER 100 WBC
PLATELET # BLD AUTO: 202 K/UL (ref 150–400)
PMV BLD AUTO: 10.7 FL (ref 8.9–12.9)
POTASSIUM SERPL-SCNC: 3.4 MMOL/L (ref 3.5–5.1)
PROT SERPL-MCNC: 8 G/DL (ref 6.4–8.2)
PROTHROMBIN TIME: 10.4 SEC (ref 9–11.1)
RBC # BLD AUTO: 4.7 M/UL (ref 3.8–5.2)
RBC MORPH BLD: ABNORMAL
SAMPLES BEING HELD,HOLD: NORMAL
SODIUM SERPL-SCNC: 138 MMOL/L (ref 136–145)
THERAPEUTIC RANGE,PTTT: NORMAL SECS (ref 58–77)
WBC # BLD AUTO: 4.9 K/UL (ref 3.6–11)

## 2021-03-31 PROCEDURE — 85025 COMPLETE CBC W/AUTO DIFF WBC: CPT

## 2021-03-31 PROCEDURE — 99282 EMERGENCY DEPT VISIT SF MDM: CPT

## 2021-03-31 PROCEDURE — 85610 PROTHROMBIN TIME: CPT

## 2021-03-31 PROCEDURE — 85730 THROMBOPLASTIN TIME PARTIAL: CPT

## 2021-03-31 PROCEDURE — 80053 COMPREHEN METABOLIC PANEL: CPT

## 2021-03-31 PROCEDURE — 93971 EXTREMITY STUDY: CPT

## 2021-03-31 PROCEDURE — 36415 COLL VENOUS BLD VENIPUNCTURE: CPT

## 2021-03-31 NOTE — TELEPHONE ENCOUNTER
----- Message from Sally Dasilva sent at 3/31/2021 10:00 AM EDT -----  Regarding: Nurse Geri/Telephone    General Message/Vendor Calls    Caller's first and last name: Self      Reason for call: Pt is going to ED      Callback required yes/no and why: Yes with questions      Best contact number(s): [phone]      Details to clarify the request: Pt feels as though she needs to go to the emergency due to the results she received stating she has a blood clot. So pt is canceling appt.       Sally Dasilva

## 2021-03-31 NOTE — ED PROVIDER NOTES
HPI .  Patient has a history of anemia due to chronic blood loss. She says that no one has been able to discover what is bleeding. She does have a history of a GI bleed and obesity. She has had swelling of her right lower extremity for 2 years. She had a wound that healed many months ago. Leg throbs and stings at times. She had a Doppler February 2020 which was technically difficult and showed groin nodes. She reports needing 5 units of blood in 2014. She thinks her last transfusion was about a year ago. She is complaining of urinary incontinence. [facility] has seen her since she does not drive. She had a home Doppler 2 days ago. She was supposed to see her primary care nurse practitioner today but came to the ER instead. Past Medical History:   Diagnosis Date    Anemia due to chronic blood loss 3/19/2011    DDD (degenerative disc disease), lumbar     ruptured disc    GI bleed 3/19/2011    Obesity (BMI 30-39. 9)        Past Surgical History:   Procedure Laterality Date    COLONOSCOPY N/A 9/12/2019    COLONOSCOPY performed by Aishwarya Osorio MD at [address] [place] [address]  4/12/11    nml    HX CATARACT REMOVAL  2008    left, with lens    HX CHOLECYSTECTOMY      HX COLONOSCOPY  3/11    HX HEENT      deviated septum repair    HX ORTHOPAEDIC  1984    right elbow fracture repair    HX ORTHOPAEDIC  2011    right elbow, carpal tunnel    HX TONSILLECTOMY      SOY MAMMOGRAM DIGITAL BILATERAL  4/5/12    nml    NEUROLOGICAL PROCEDURE UNLISTED  2009    ulnar release--right elbow    [place] TOTAL HIP ARTHROPLASTY  1998    left         Family History:   Problem Relation Age of Onset    Alcohol abuse Father     COPD Father     Cancer Brother         bladder    Alcohol abuse Sister     Dementia Sister     Heart Disease Brother         PAD    Heart Disease Brother        Social History     Socioeconomic History    Marital status:      Spouse name:     Number of children: 1    Years of education: Not on file    Highest education level: Not on file   Occupational History    Occupation: Retired--government     Employer: RETIRED   Social Needs    Financial resource strain: Not on file    Food insecurity     Worry: Not on file     Inability: Not on file   [employer] needs     Medical: Not on file     Non-medical: Not on file   Tobacco Use    Smoking status: Never Smoker    Smokeless tobacco: Never Used   Substance and Sexual Activity    Alcohol use: No    Drug use: No    Sexual activity: Not Currently   Lifestyle    Physical activity     Days per week: Not on file     Minutes per session: Not on file    Stress: Not on file   Relationships    Social connections     Talks on phone: Not on file     Gets together: Not on file     Attends Taoist service: Not on file     Active member of club or organization: Not on file     Attends meetings of clubs or organizations: Not on file     Relationship status: Not on file    Intimate partner violence     Fear of current or ex partner: Not on file     Emotionally abused: Not on file     Physically abused: Not on file     Forced sexual activity: Not on file   Other Topics Concern    Not on file   Social History Narrative    Not on file         ALLERGIES: Contuss and Sulfa (sulfonamide antibiotics)    Review of Systems   All other systems reviewed and are negative. Vitals:    03/31/21 1138   BP: 129/87   Pulse: 75   Resp: 16   Temp: 98 °F (36.7 °C)   SpO2: 96%            Physical Exam  Vitals signs and nursing note reviewed. Constitutional:       Appearance: She is well-developed. HENT:      Head: Normocephalic and atraumatic. Eyes:      Pupils: Pupils are equal, round, and reactive to light. Neck:      Musculoskeletal: Normal range of motion and neck supple. Cardiovascular:      Rate and Rhythm: Normal rate and regular rhythm. Heart sounds: Normal heart sounds. No murmur.  No friction rub. No gallop. Pulmonary:      Effort: Pulmonary effort is normal. No respiratory distress. Breath sounds: No wheezing or rales. Abdominal:      Palpations: Abdomen is soft. Tenderness: There is no abdominal tenderness. There is no rebound. Musculoskeletal: Normal range of motion. General: No tenderness. Comments: Right lower extremity is larger than the left lower extremity   Skin:     Findings: No erythema. Neurological:      Mental Status: She is alert. Cranial Nerves: No cranial nerve deficit.       Comments: Motor; symmetric   Psychiatric:         Behavior: Behavior normal.          MDM       Procedures

## 2021-04-16 ENCOUNTER — OFFICE VISIT (OUTPATIENT)
Dept: FAMILY MEDICINE CLINIC | Age: 81
End: 2021-04-16
Payer: MEDICARE

## 2021-04-16 VITALS
RESPIRATION RATE: 18 BRPM | TEMPERATURE: 98.5 F | OXYGEN SATURATION: 97 % | HEART RATE: 89 BPM | DIASTOLIC BLOOD PRESSURE: 80 MMHG | BODY MASS INDEX: 36.74 KG/M2 | HEIGHT: 63 IN | SYSTOLIC BLOOD PRESSURE: 119 MMHG

## 2021-04-16 DIAGNOSIS — E55.9 VITAMIN D DEFICIENCY: ICD-10-CM

## 2021-04-16 DIAGNOSIS — Z00.00 ENCOUNTER FOR INITIAL PREVENTIVE PHYSICAL EXAMINATION COVERED BY MEDICARE: ICD-10-CM

## 2021-04-16 DIAGNOSIS — R35.0 URINARY FREQUENCY: Primary | ICD-10-CM

## 2021-04-16 DIAGNOSIS — M79.89 RIGHT LEG SWELLING: ICD-10-CM

## 2021-04-16 DIAGNOSIS — E66.01 SEVERE OBESITY (HCC): ICD-10-CM

## 2021-04-16 DIAGNOSIS — Z76.89 ENCOUNTER FOR SUPPORT AND COORDINATION OF TRANSITION OF CARE: ICD-10-CM

## 2021-04-16 LAB
BILIRUB UR QL STRIP: NORMAL
GLUCOSE UR-MCNC: NORMAL MG/DL
KETONES P FAST UR STRIP-MCNC: NEGATIVE MG/DL
PH UR STRIP: 5.5 [PH] (ref 4.6–8)
PROT UR QL STRIP: NORMAL
SP GR UR STRIP: 1.03 (ref 1–1.03)
UA UROBILINOGEN AMB POC: NORMAL (ref 0.2–1)
URINALYSIS CLARITY POC: NORMAL
URINALYSIS COLOR POC: NORMAL
URINE BLOOD POC: NEGATIVE
URINE LEUKOCYTES POC: NEGATIVE
URINE NITRITES POC: NEGATIVE

## 2021-04-16 PROCEDURE — G8419 CALC BMI OUT NRM PARAM NOF/U: HCPCS | Performed by: NURSE PRACTITIONER

## 2021-04-16 PROCEDURE — G8536 NO DOC ELDER MAL SCRN: HCPCS | Performed by: NURSE PRACTITIONER

## 2021-04-16 PROCEDURE — G8399 PT W/DXA RESULTS DOCUMENT: HCPCS | Performed by: NURSE PRACTITIONER

## 2021-04-16 PROCEDURE — G8427 DOCREV CUR MEDS BY ELIG CLIN: HCPCS | Performed by: NURSE PRACTITIONER

## 2021-04-16 PROCEDURE — G8432 DEP SCR NOT DOC, RNG: HCPCS | Performed by: NURSE PRACTITIONER

## 2021-04-16 PROCEDURE — 1101F PT FALLS ASSESS-DOCD LE1/YR: CPT | Performed by: NURSE PRACTITIONER

## 2021-04-16 PROCEDURE — G0439 PPPS, SUBSEQ VISIT: HCPCS | Performed by: NURSE PRACTITIONER

## 2021-04-16 PROCEDURE — 81003 URINALYSIS AUTO W/O SCOPE: CPT | Performed by: NURSE PRACTITIONER

## 2021-04-16 NOTE — PROGRESS NOTES
[place] [facility] Note  Subjective:      Gume Houston is a 80 y.o. female who presents for with care taker for transition of care from ER visit for right leg swelling on 3/31/21. Her doppler study showed inconclusive, she was advised to elevate her leg. Comes today stating the the swelling has improved. She is also complaining of urinary frequency, she uses diaper at night, unable to hold urine. She states that she drink 3-4 glasses of water a day. She is due for medicare physical     Past Medical History:   Diagnosis Date    Anemia due to chronic blood loss 3/19/2011    DDD (degenerative disc disease), lumbar     ruptured disc    GI bleed 3/19/2011    Obesity (BMI 30-39. 9)      Past Surgical History:   Procedure Laterality Date    COLONOSCOPY N/A 9/12/2019    COLONOSCOPY performed by Lillie Prince MD at [address] [place] [address]  4/12/11    nml    HX CATARACT REMOVAL  2008    left, with lens    HX CHOLECYSTECTOMY      HX COLONOSCOPY  3/11    HX HEENT      deviated septum repair    HX ORTHOPAEDIC  1984    right elbow fracture repair    HX ORTHOPAEDIC  2011    right elbow, carpal tunnel    HX TONSILLECTOMY      SOY MAMMOGRAM DIGITAL BILATERAL  4/5/12    nml    NEUROLOGICAL PROCEDURE UNLISTED  2009    ulnar release--right elbow    [place] TOTAL HIP ARTHROPLASTY  1998    left       Current Outpatient Medications   Medication Sig Dispense Refill    omeprazole (PRILOSEC) 20 mg capsule TAKE 1 CAPSULE BY MOUTH DAILY 90 Cap 0    bismuth subsalicylate (PEPTO-BISMOL) 262 mg/15 mL suspension Take 30 mL by mouth every four (4) hours as needed for Indigestion.  magnesium hydroxide (NAYAK MILK OF MAGNESIA) 400 mg/5 mL suspension Take 30 mL by mouth daily as needed for Constipation.  white petrolatum (LACRILUBE) ointment Administer  to both eyes nightly.       dextran 70-hypromellose (ARTIFICIAL TEARS) ophthalmic solution Administer  to both eyes as needed.  ergocalciferol (ERGOCALCIFEROL) 1,250 mcg (50,000 unit) capsule Take 1 Cap by mouth every seven (7) days. 12 Cap 3    fluocinoNIDE (LIDEX) 0.05 % topical cream Apply  to affected area two (2) times a day. 30 g 0    nystatin (MYCOSTATIN) powder Apply to effected area bid 60 g 1    ferrous sulfate (SLOW FE) 142 mg (45 mg iron) ER tablet Take 1 tab daily 30 Tab 2     Allergies   Allergen Reactions    Contuss Unknown (comments)     Unknown      Sulfa (Sulfonamide Antibiotics) Hives and Swelling       ROS:   Complete review of systems was reviewed with pertinent information listed in HPI. Review of Systems   Constitutional: Negative. HENT: Negative. Eyes: Negative. Respiratory: Negative. Cardiovascular: Positive for leg swelling. Gastrointestinal: Negative. Genitourinary: Negative. Skin: Negative. Objective:     Visit Vitals  /80 (BP 1 Location: Right upper arm, BP Patient Position: Sitting, BP Cuff Size: Large adult)   Pulse 89   Temp 98.5 °F (36.9 °C) (Temporal)   Resp 18   Ht 5' 3\" (1.6 m)   SpO2 97%   BMI 36.74 kg/m²       Vitals and Nurse Documentation reviewed. Physical Exam  Constitutional:       Comments: Walking with walker   HENT:      Mouth/Throat:      Mouth: Mucous membranes are moist.   Neck:      Musculoskeletal: Normal range of motion and neck supple. Comments: Right leg swelling, with some redness  Has improved since ER visit   Cardiovascular:      Rate and Rhythm: Normal rate and regular rhythm. Pulses: Normal pulses. Heart sounds: Normal heart sounds. No murmur. Pulmonary:      Effort: Pulmonary effort is normal.      Breath sounds: Normal breath sounds. Abdominal:      General: Bowel sounds are normal.      Palpations: Abdomen is soft. Genitourinary:     Comments: UA is clear for infection and blood   Musculoskeletal:      Comments: Walking with walker   Neurological:      Mental Status: She is alert.    Psychiatric: Mood and Affect: Mood normal.         Thought Content: Thought content normal.         Assessment/Plan:     Diagnoses and all orders for this visit:    1. Encounter for initial preventive physical examination covered by Medicare    2. Vitamin D deficiency  -     VITAMIN D, 25 HYDROXY; Future    3. Severe obesity ([facility] [address]    4. Urinary frequency  -     AMB POC URINALYSIS DIP STICK AUTO W/ MICRO; Future    5. Encounter for support and coordination of transition of care          Pt expressed understanding with the diagnosis and plan        Discussed expected course/resolution/complications of diagnosis in detail with patient.    Medication risks/benefits/costs/interactions/alternatives discussed with patient.    Pt was given an after visit summary which includes diagnoses, current medications & vitals.  Pt expressed understanding with the diagnosis and plan  This is the Subsequent Medicare Annual Wellness Exam, performed 12 months or more after the Initial AWV or the last Subsequent AWV    I have reviewed the patient's medical history in detail and updated the computerized patient record. Assessment/Plan   Education and counseling provided:  Are appropriate based on today's review and evaluation    1. Encounter for initial preventive physical examination covered by Medicare  2. Vitamin D deficiency  -     VITAMIN D, 25 HYDROXY; Future  3. Severe obesity ([facility] [address]  4. Urinary frequency  -     AMB POC URINALYSIS DIP STICK AUTO W/ MICRO;  Future       Depression Risk Factor Screening     3 most recent PHQ Screens 4/16/2021   Little interest or pleasure in doing things Several days   Feeling down, depressed, irritable, or hopeless Several days   Total Score PHQ 2 2       Alcohol Risk Screen    Do you average more than 1 drink per night or more than 7 drinks a week:  No    On any one occasion in the past three months have you have had more than 3 drinks containing alcohol:  No        Functional Ability and Level of Safety Hearing: The patient needs further evaluation. Activities of Daily Living: The home contains: handrails, grab bars and rugs  Patient needs help with:  transportation and housework      Ambulation: with difficulty, can't walk further than walker feet     Fall Risk:  Fall Risk Assessment, last 12 mths 4/16/2021   Able to walk? Yes   Fall in past 12 months? 1   Do you feel unsteady? 1   Are you worried about falling 1   Is TUG test greater than 12 seconds? 0   Is the gait abnormal? 1   Number of falls in past 12 months 0   Fall with injury?  0      Abuse Screen:  Patient is not abused       Cognitive Screening    Has your family/caregiver stated any concerns about your memory: no     Cognitive Screening: Normal - Verbal Fluency Test    Health Maintenance Due     Health Maintenance Due   Topic Date Due    COVID-19 Vaccine (1) Never done    DTaP/Tdap/Td series (1 - Tdap) Never done    Shingrix Vaccine Age 50> (1 of 2) Never done    Pneumococcal 65+ years (2 of 2 - PPSV23) 03/26/2016       Patient Care Team   Patient Care Team:  Tani Mejia NP as PCP - General (Family Medicine)  Tani Mejia NP as PCP - [facility] Provider  Sandra Larkin MD as Physician (Ophthalmology)  Tana Garcia MD as Surgeon (Orthopedic Surgery)  Chani Garrido MD as Physician (Gastroenterology)    History     Patient Active Problem List   Diagnosis Code    GERD (gastroesophageal reflux disease) K21.9    Ghotra's esophagus K22.70    Dry eyes H04.123    TMJ arthralgia M26.629    Rosacea L71.9    Allergic rhinitis J30.9    Headache(784.0) R51    Insomnia G47.00    Cataract H26.9    Osteoarthritis M19.90    Diverticula of colon K57.30    IBS (irritable bowel syndrome) K58.9    Severe obesity ([address] E66.01    Severe anemia D64.9     Past Medical History:   Diagnosis Date    Anemia due to chronic blood loss 3/19/2011    DDD (degenerative disc disease), lumbar     ruptured disc    GI bleed 3/19/2011    Obesity (BMI 30-39. 9)       Past Surgical History:   Procedure Laterality Date    COLONOSCOPY N/A 9/12/2019    COLONOSCOPY performed by Anca Gilbert MD at [address] [place] [address]  4/12/11    nml    HX CATARACT REMOVAL  2008    left, with lens    HX CHOLECYSTECTOMY      HX COLONOSCOPY  3/11    HX HEENT      deviated septum repair    HX ORTHOPAEDIC  1984    right elbow fracture repair    HX ORTHOPAEDIC  2011    right elbow, carpal tunnel    HX TONSILLECTOMY      SOY MAMMOGRAM DIGITAL BILATERAL  4/5/12    nml    NEUROLOGICAL PROCEDURE UNLISTED  2009    ulnar release--right elbow    [place] TOTAL HIP ARTHROPLASTY  1998    left     Current Outpatient Medications   Medication Sig Dispense Refill    omeprazole (PRILOSEC) 20 mg capsule TAKE 1 CAPSULE BY MOUTH DAILY 90 Cap 0    bismuth subsalicylate (PEPTO-BISMOL) 262 mg/15 mL suspension Take 30 mL by mouth every four (4) hours as needed for Indigestion.  magnesium hydroxide (NAYAK MILK OF MAGNESIA) 400 mg/5 mL suspension Take 30 mL by mouth daily as needed for Constipation.  white petrolatum (LACRILUBE) ointment Administer  to both eyes nightly.  dextran 70-hypromellose (ARTIFICIAL TEARS) ophthalmic solution Administer  to both eyes as needed.  ergocalciferol (ERGOCALCIFEROL) 1,250 mcg (50,000 unit) capsule Take 1 Cap by mouth every seven (7) days. 12 Cap 3    fluocinoNIDE (LIDEX) 0.05 % topical cream Apply  to affected area two (2) times a day.  30 g 0    nystatin (MYCOSTATIN) powder Apply to effected area bid 60 g 1    ferrous sulfate (SLOW FE) 142 mg (45 mg iron) ER tablet Take 1 tab daily 30 Tab 2     Allergies   Allergen Reactions    Contuss Unknown (comments)     Unknown      Sulfa (Sulfonamide Antibiotics) Hives and Swelling       Family History   Problem Relation Age of Onset    Alcohol abuse Father     COPD Father     Cancer Brother         bladder    Alcohol abuse Sister     Dementia Sister     Heart Disease Brother         PAD    Heart Disease Brother      Social History     Tobacco Use    Smoking status: Never Smoker    Smokeless tobacco: Never Used   Substance Use Topics    Alcohol use: No         Desiree Serrano NP

## 2021-04-16 NOTE — PATIENT INSTRUCTIONS

## 2021-04-16 NOTE — PROGRESS NOTES
Chief Complaint   Patient presents with    Annual Wellness Visit    Transitions Of Care       1. Have you been to the ER, urgent care clinic since your last visit? Hospitalized since your last visit? Yes When: march 31 Where: [facility]  Reason for visit: right leg pain    2. Have you seen or consulted any other health care providers outside of the [address] [place] [address] [place] since your last visit? Include any pap smears or colon screening. No    3 most recent PHQ Screens 4/16/2021   Little interest or pleasure in doing things Several days   Feeling down, depressed, irritable, or hopeless Several days   Total Score PHQ 2 2       Abuse Screening Questionnaire 4/16/2021   Do you ever feel afraid of your partner? N   Are you in a relationship with someone who physically or mentally threatens you? N   Is it safe for you to go home? Y       ADL Assessment 4/16/2021   Feeding yourself No Help Needed   Getting from bed to chair Help Needed   Getting dressed No Help Needed   Bathing or showering No Help Needed   Walk across the room (includes cane/walker) Help Needed   Using the telphone No Help Needed   Taking your medications No Help Needed   Preparing meals No Help Needed   Managing money (expenses/bills) No Help Needed   Moderately strenuous housework (laundry) No Help Needed   Shopping for personal items (toiletries/medicines) No Help Needed   Shopping for groceries No Help Needed   Driving No Help Needed   Climbing a flight of stairs No Help Needed   Getting to places beyond walking distances No Help Needed       Fall Risk Assessment, last 12 mths 4/16/2021   Able to walk? Yes   Fall in past 12 months? 1   Do you feel unsteady? 1   Are you worried about falling 1   Is TUG test greater than 12 seconds? 0   Is the gait abnormal? 1   Number of falls in past 12 months 0   Fall with injury?  0

## 2021-04-17 LAB — 25(OH)D3 SERPL-MCNC: 31.4 NG/ML (ref 30–100)

## 2021-05-14 DIAGNOSIS — K21.9 GASTROESOPHAGEAL REFLUX DISEASE WITHOUT ESOPHAGITIS: ICD-10-CM

## 2021-05-15 RX ORDER — OMEPRAZOLE 20 MG/1
CAPSULE, DELAYED RELEASE ORAL
Qty: 90 CAP | Refills: 0 | Status: SHIPPED | OUTPATIENT
Start: 2021-05-15 | End: 2021-08-13

## 2021-08-12 DIAGNOSIS — K21.9 GASTROESOPHAGEAL REFLUX DISEASE WITHOUT ESOPHAGITIS: ICD-10-CM

## 2021-08-13 RX ORDER — OMEPRAZOLE 20 MG/1
CAPSULE, DELAYED RELEASE ORAL
Qty: 90 CAPSULE | Refills: 0 | Status: SHIPPED | OUTPATIENT
Start: 2021-08-13 | End: 2021-08-26 | Stop reason: SDUPTHER

## 2021-08-25 DIAGNOSIS — K21.9 GASTROESOPHAGEAL REFLUX DISEASE WITHOUT ESOPHAGITIS: ICD-10-CM

## 2021-08-26 RX ORDER — OMEPRAZOLE 20 MG/1
CAPSULE, DELAYED RELEASE ORAL
Qty: 90 CAPSULE | Refills: 0 | Status: SHIPPED | OUTPATIENT
Start: 2021-08-26 | End: 2022-02-08

## 2022-02-08 DIAGNOSIS — K21.9 GASTROESOPHAGEAL REFLUX DISEASE WITHOUT ESOPHAGITIS: ICD-10-CM

## 2022-02-08 RX ORDER — OMEPRAZOLE 20 MG/1
CAPSULE, DELAYED RELEASE ORAL
Qty: 90 CAPSULE | Refills: 0 | Status: SHIPPED | OUTPATIENT
Start: 2022-02-08

## 2022-03-19 PROBLEM — E66.01 SEVERE OBESITY: Status: ACTIVE | Noted: 2019-09-06

## 2022-03-19 PROBLEM — D64.9 SEVERE ANEMIA: Status: ACTIVE | Noted: 2019-09-10

## 2024-10-21 ENCOUNTER — HOSPITAL ENCOUNTER (OUTPATIENT)
Facility: HOSPITAL | Age: 84
Setting detail: OUTPATIENT SURGERY
Discharge: HOME | End: 2024-10-21
Attending: INTERNAL MEDICINE | Admitting: INTERNAL MEDICINE
Payer: MEDICARE

## 2024-10-21 ENCOUNTER — ANESTHESIA (OUTPATIENT)
Facility: HOSPITAL | Age: 84
End: 2024-10-21
Payer: MEDICARE

## 2024-10-21 ENCOUNTER — ANESTHESIA EVENT (OUTPATIENT)
Facility: HOSPITAL | Age: 84
End: 2024-10-21
Payer: MEDICARE

## 2024-10-21 VITALS
HEART RATE: 82 BPM | OXYGEN SATURATION: 97 % | HEIGHT: 62 IN | RESPIRATION RATE: 19 BRPM | DIASTOLIC BLOOD PRESSURE: 64 MMHG | SYSTOLIC BLOOD PRESSURE: 135 MMHG | WEIGHT: 190 LBS | TEMPERATURE: 97.3 F | BODY MASS INDEX: 34.96 KG/M2

## 2024-10-21 PROCEDURE — 2720000010 HC SURG SUPPLY STERILE: Performed by: INTERNAL MEDICINE

## 2024-10-21 PROCEDURE — 7100000010 HC PHASE II RECOVERY - FIRST 15 MIN: Performed by: INTERNAL MEDICINE

## 2024-10-21 PROCEDURE — 6360000002 HC RX W HCPCS: Performed by: NURSE ANESTHETIST, CERTIFIED REGISTERED

## 2024-10-21 PROCEDURE — 7100000011 HC PHASE II RECOVERY - ADDTL 15 MIN: Performed by: INTERNAL MEDICINE

## 2024-10-21 PROCEDURE — 3600007502: Performed by: INTERNAL MEDICINE

## 2024-10-21 PROCEDURE — 88305 TISSUE EXAM BY PATHOLOGIST: CPT

## 2024-10-21 PROCEDURE — 2709999900 HC NON-CHARGEABLE SUPPLY: Performed by: INTERNAL MEDICINE

## 2024-10-21 PROCEDURE — 2500000003 HC RX 250 WO HCPCS: Performed by: NURSE ANESTHETIST, CERTIFIED REGISTERED

## 2024-10-21 PROCEDURE — 2580000003 HC RX 258: Performed by: NURSE ANESTHETIST, CERTIFIED REGISTERED

## 2024-10-21 PROCEDURE — 3700000000 HC ANESTHESIA ATTENDED CARE: Performed by: INTERNAL MEDICINE

## 2024-10-21 RX ORDER — PSEUDOEPHEDRINE HCL 30 MG
TABLET ORAL
COMMUNITY
Start: 2024-09-23

## 2024-10-21 RX ORDER — LIDOCAINE HYDROCHLORIDE 20 MG/ML
INJECTION, SOLUTION EPIDURAL; INFILTRATION; INTRACAUDAL; PERINEURAL
Status: DISCONTINUED | OUTPATIENT
Start: 2024-10-21 | End: 2024-10-21 | Stop reason: SDUPTHER

## 2024-10-21 RX ORDER — SODIUM CHLORIDE 0.9 % (FLUSH) 0.9 %
5-40 SYRINGE (ML) INJECTION PRN
Status: CANCELLED | OUTPATIENT
Start: 2024-10-21

## 2024-10-21 RX ORDER — SENNOSIDES 8.6 MG
650 CAPSULE ORAL 2 TIMES DAILY
COMMUNITY

## 2024-10-21 RX ORDER — DULOXETIN HYDROCHLORIDE 30 MG/1
30 CAPSULE, DELAYED RELEASE ORAL DAILY
COMMUNITY
Start: 2024-06-20

## 2024-10-21 RX ORDER — SODIUM CHLORIDE 9 MG/ML
INJECTION, SOLUTION INTRAVENOUS CONTINUOUS
Status: CANCELLED | OUTPATIENT
Start: 2024-10-21

## 2024-10-21 RX ORDER — FERROUS SULFATE 325(65) MG
325 TABLET, DELAYED RELEASE (ENTERIC COATED) ORAL DAILY
COMMUNITY
Start: 2024-06-20

## 2024-10-21 RX ORDER — ASPIRIN 81 MG/1
TABLET, CHEWABLE ORAL
COMMUNITY
Start: 2024-06-20

## 2024-10-21 RX ORDER — LIDOCAINE HYDROCHLORIDE 20 MG/ML
INJECTION, SOLUTION EPIDURAL; INFILTRATION; INTRACAUDAL; PERINEURAL
Status: DISCONTINUED | OUTPATIENT
Start: 2024-10-21 | End: 2024-10-21

## 2024-10-21 RX ORDER — SODIUM CHLORIDE 9 MG/ML
INJECTION, SOLUTION INTRAVENOUS PRN
Status: CANCELLED | OUTPATIENT
Start: 2024-10-21

## 2024-10-21 RX ORDER — SODIUM CHLORIDE 0.9 % (FLUSH) 0.9 %
5-40 SYRINGE (ML) INJECTION EVERY 12 HOURS SCHEDULED
Status: CANCELLED | OUTPATIENT
Start: 2024-10-21

## 2024-10-21 RX ORDER — FOLIC ACID 1 MG/1
TABLET ORAL
COMMUNITY
Start: 2024-06-21

## 2024-10-21 RX ORDER — ERGOCALCIFEROL 1.25 MG/1
CAPSULE ORAL
COMMUNITY
Start: 2024-06-20

## 2024-10-21 RX ORDER — 0.9 % SODIUM CHLORIDE 0.9 %
INTRAVENOUS SOLUTION INTRAVENOUS
Status: DISCONTINUED | OUTPATIENT
Start: 2024-10-21 | End: 2024-10-21 | Stop reason: SDUPTHER

## 2024-10-21 RX ADMIN — PROPOFOL 50 MG: 10 INJECTION, EMULSION INTRAVENOUS at 13:52

## 2024-10-21 RX ADMIN — SODIUM CHLORIDE 50 ML: 9 INJECTION, SOLUTION INTRAVENOUS at 13:58

## 2024-10-21 RX ADMIN — PROPOFOL 25 MG: 10 INJECTION, EMULSION INTRAVENOUS at 13:54

## 2024-10-21 RX ADMIN — PROPOFOL 50 MG: 10 INJECTION, EMULSION INTRAVENOUS at 13:50

## 2024-10-21 RX ADMIN — LIDOCAINE HYDROCHLORIDE 40 MG: 20 INJECTION, SOLUTION EPIDURAL; INFILTRATION; INTRACAUDAL; PERINEURAL at 13:50

## 2024-10-21 ASSESSMENT — PAIN - FUNCTIONAL ASSESSMENT: PAIN_FUNCTIONAL_ASSESSMENT: NONE - DENIES PAIN

## 2024-10-21 NOTE — OP NOTE
[facility]  [address]  [place] [address]  [phone]                              Esophagogastroduodenoscopy (EGD) Procedure Note    NAME:  Divina Horner     :   1940     MRN:   [id number]     Date/Time:  10/21/2024 2:05 PM    :  Eun Cruz MD    Staff: Circulator: Namrata Gimenez RN  Endoscopy Technician: Josr Borges    Referring Provider:  Galilea Johnson, APRN - NP    Anethesia/Sedation:  MAC anesthesia    Procedure Details   After infomed consent was obtained for the procedure, with all risks and benefits of procedure explained the patient was taken to the endoscopy suite and placed in the left lateral decubitus position.  Following sequential administration of sedation as per above, the gastroscope was inserted into the mouth and advanced under direct vision to second portion of the duodenum.  A careful inspection was made as the gastroscope was withdrawn, including a retroflexed view of the proximal stomach; findings and interventions are described below.      Findings:  Esophagus:GE junction 30 cm from the incisors with very large (10 cm) complex hiatal hernia. Hernia sac contains small non-bleeding linear erosion's  Stomach:normal mucosa aside from hernia  Duodenum/jejunum:mild duodenitis - biopsied    Interventions:  biopsies            Specimens Removed:    ID Type Source Tests Collected by Time Destination   1 : duodenum biopsy Tissue Duodenum SURGICAL PATHOLOGY Eun Cruz MD 10/21/2024 8776        Complications: None.     EBL:  minimal     Impression:    See Postoperative diagnosis above    Recommendations:   - Await pathology. You should receive a letter within 2 weeks.   - Resume normal medications.  - Continue BID PPI daily.     Discharge disposition:  Home in the company of  when able to ambulate    Eun Cruz MD

## 2024-10-21 NOTE — DISCHARGE INSTRUCTIONS
(nauseated).  Once you are feeling back to normal, you may resume your normal diet as instructed by your doctor.  Avoid alcoholic beverages for 24 hours or as instructed.    IF YOU HAD BIOPSIES TAKEN OR POLYPS REMOVED DURING THE PROCEDURE:  For the next 7 days, avoid all non-steroidal antiinflammatory medications such as Ibuprofen, Motrin, Advil, Alleve, Anne-seltzer, Goody's powder, BC powder.  If you do not have an heart condition that requires you to take a daily aspirin, you should avoid taking aspirin for 7 days.  Eat a soft diet for 24 hours.  Monitor your stools for any blood or dark black, tar-like, stools as this may be a sign of bleeding and if you see any blood, notify your doctor immediately.    GET HELP RIGHT AWAY AND SEEK IMMEDIATE MEDICAL CARE IF:  You have more than a spotting of blood in your stool.  You pass clumps of tissue (blood clots) or fill the toilet with blood.  Your belly is painfully swollen or puffy (abdominal distention).  You throw up (vomit).  You have a fever.  You have redness, pain or swelling at the IV site that last greater than two days.  You have abdominal pain or discomfort that is severe or gets worse throughout the day.    Post-procedure recommendations:     Findings:  Esophagus:GE junction 30 cm from the incisors with very large (10 cm) complex hiatal hernia. Hernia sac contains small non-bleeding linear erosion's  Stomach:normal mucosa aside from hernia  Duodenum/jejunum:mild duodenitis - biopsied      Recommendations:   - Await pathology. You should receive a letter within 2 weeks.   - Resume normal medications.  - Continue BID PPI daily.

## 2024-10-21 NOTE — ANESTHESIA PRE PROCEDURE
Department of Anesthesiology  Preprocedure Note       Name:  Divina Horner   Age:  84 y.o.  :  1940                                          MRN:  [id number]         Date:  10/21/2024      Surgeon: Surgeon(s):  Eun Cruz MD    Procedure: Procedure(s):  ESOPHAGOGASTRODUODENOSCOPY    Medications prior to admission:   Prior to Admission medications    Not on File       Current medications:    No current facility-administered medications for this encounter.       Allergies:    Allergies   Allergen Reactions   • Sulfa Antibiotics    • Amoxicillin Rash       Problem List:    Patient Active Problem List   Diagnosis Code   • Allergic rhinitis J30.9   • Osteoarthritis M19.90   • Diverticula of colon K57.30   • GERD (gastroesophageal reflux disease) K21.9   • Dry eyes H04.123   • IBS (irritable bowel syndrome) K58.9   • Severe obesity E66.01   • Rosacea L71.9   • TMJ arthralgia M26.629   • Severe anemia D64.9   • Cataract H26.9   • Insomnia G47.00   • Mendiola's esophagus K22.70       Past Medical History:        Diagnosis Date   • Anemia due to chronic blood loss 3/19/2011   • DDD (degenerative disc disease), lumbar     ruptured disc   • GI bleed 3/19/2011   • Obesity (BMI 30-39.9)        Past Surgical History:        Procedure Laterality Date   • CATARACT REMOVAL      left, with lens   • CHOLECYSTECTOMY     • COLONOSCOPY N/A 2019    COLONOSCOPY performed by Eddie Spicer MD at [facility] ENDOSCOPY   • COLONOSCOPY  3/11   • DEXA BONE DENSITY AXIAL SKELETON  11    nml   • HEENT      deviated septum repair   • LA MAMMOGRAM DIGITAL BILATERAL  12    nml   • NEUROLOGICAL SURGERY      ulnar release--right elbow   • ORTHOPEDIC SURGERY      right elbow, carpal tunnel   • ORTHOPEDIC SURGERY      right elbow fracture repair   • TONSILLECTOMY     • TOTAL HIP ARTHROPLASTY  1998    left       Social History:    Social History     Tobacco Use   • Smoking status: Never   • Smokeless tobacco:

## 2024-10-21 NOTE — H&P
[facility]  [address]  [place] [address] [phone]                                History and Physical     NAME: Divina Horner   :  1940   MRN:  [id number]     HPI:  The patient was seen and examined.    Past Surgical History:   Procedure Laterality Date    CATARACT REMOVAL      left, with lens    CHOLECYSTECTOMY      COLONOSCOPY N/A 2019    COLONOSCOPY performed by Eddie Spicer MD at [facility] ENDOSCOPY    COLONOSCOPY  3/11    DEXA BONE DENSITY AXIAL SKELETON  11    nml    HEENT      deviated septum repair    LA MAMMOGRAM DIGITAL BILATERAL  12    nm    NEUROLOGICAL SURGERY      ulnar release--right elbow    ORTHOPEDIC SURGERY      right elbow, carpal tunnel    ORTHOPEDIC SURGERY  1984    right elbow fracture repair    TONSILLECTOMY      TOTAL HIP ARTHROPLASTY  1998    left     Past Medical History:   Diagnosis Date    Anemia due to chronic blood loss 3/19/2011    DDD (degenerative disc disease), lumbar     ruptured disc    GI bleed 3/19/2011    Obesity (BMI 30-39.9)      Social History     Tobacco Use    Smoking status: Never    Smokeless tobacco: Never   Substance Use Topics    Alcohol use: No    Drug use: No     Allergies   Allergen Reactions    Sulfa Antibiotics     Amoxicillin Rash     Family History   Problem Relation Age of Onset    Heart Disease Brother     Heart Disease Brother         PAD    COPD Father     Dementia Sister     Alcohol Abuse Sister     Cancer Brother         bladder    Alcohol Abuse Father      No current facility-administered medications for this encounter.         PHYSICAL EXAM:  General: WD, WN. Alert, cooperative, no acute distress    HEENT: NC, Atraumatic.  PERRLA, EOMI. Anicteric sclerae.  Lungs:  CTA Bilaterally. No Wheezing/Rhonchi/Rales.  Heart:  Regular  rhythm,  No murmur, No Rubs, No Gallops  Abdomen: Soft, Non distended, Non tender.  +Bowel sounds, no HSM  Extremities: No c/c/e  Neurologic:  CN 2-12

## 2024-10-21 NOTE — ANESTHESIA POSTPROCEDURE EVALUATION
Department of Anesthesiology  Postprocedure Note    Patient: Divina Horner  MRN: [id number]  YOB: 1940  Date of evaluation: 10/21/2024    Procedure Summary       Date: 10/21/24 Room / Location: [facility] ENDO 04 / [facility] ENDOSCOPY    Anesthesia Start: 1340 Anesthesia Stop: 1359    Procedure: ESOPHAGOGASTRODUODENOSCOPY (Upper GI Region) Diagnosis:       Adin lesion, chronic      Hiatal hernia      Iron deficiency anemia, unspecified iron deficiency anemia type      (Adin lesion, chronic [K25.7])      (Hiatal hernia [K44.9])      (Iron deficiency anemia, unspecified iron deficiency anemia type [D50.9])    Surgeons: Eun Cruz MD Responsible Provider: Layo Queen MD    Anesthesia Type: MAC ASA Status: 2            Anesthesia Type: No value filed.    Mookie Phase I: Mookie Score: 10    Mookie Phase II: Mookie Score: 10    Anesthesia Post Evaluation  Post-Anesthesia Evaluation and Assessment    Patient: Divina Horner MRN: [id number]  SSN: [id number]    YOB: 1940  Age: 84 y.o.  Sex: female      I have evaluated the patient and they are stable and ready for discharge from the PACU.     Cardiovascular Function/Vital Signs  Visit Vitals  /64   Pulse 82   Temp 97.3 °F (36.3 °C) (Temporal)   Resp 19   Ht 1.575 m (5' 2\")   Wt 86.2 kg (190 lb)   SpO2 97%   BMI 34.75 kg/m²       Patient is status post Monitor Anesthesia Care anesthesia for Procedure(s):  ESOPHAGOGASTRODUODENOSCOPY.    Nausea/Vomiting: None    Postoperative hydration reviewed and adequate.    Pain:      Managed    Neurological Status:       At baseline    Mental Status, Level of Consciousness: Alert and  oriented to person, place, and time    Pulmonary Status:       Adequate oxygenation and airway patent    Complications related to anesthesia: None    Post-anesthesia assessment completed. No concerns    Signed By: Layo Queen MD     October 21, 2024                No notable events documented.

## 2024-10-21 NOTE — PROGRESS NOTES
Initial RN admission and assessment performed and documented in Endoscopy navigator.     Patient evaluated by anesthesia in pre-procedure holding.     All procedural vital signs, airway assessment, and level of consciousness information monitored and recorded by anesthesia staff on the anesthesia record.     Report received from CRNA post procedure.  Patient transported to recovery area by RN.    Endoscopy post procedure time out was performed and specimens were verified with physician.    Endoscope was pre-cleaned at bedside immediately following procedure by GAMAL Borges.

## (undated) DEVICE — TUBING HYDR IRR --

## (undated) DEVICE — ORISE PROKNIFE 3.0 MM ELECTRODE: Brand: ORISE™ PROKNIFE

## (undated) DEVICE — FORCEPS BX L240CM JAW DIA2.8MM L CAP W/ NDL MIC MESH TOOTH

## (undated) DEVICE — TUBING IRRIG COMPATIBLE W ERBE MEDIVATOR PMP HYDR

## (undated) DEVICE — ORISE PROKNIFE 1.5 MM ELECTRODE: Brand: ORISE™ PROKNIFE